# Patient Record
Sex: MALE | Race: BLACK OR AFRICAN AMERICAN | NOT HISPANIC OR LATINO
[De-identification: names, ages, dates, MRNs, and addresses within clinical notes are randomized per-mention and may not be internally consistent; named-entity substitution may affect disease eponyms.]

---

## 2022-03-23 ENCOUNTER — TELEPHONE (OUTPATIENT)
Dept: SCHEDULING | Age: 32
End: 2022-03-23

## 2022-03-23 ENCOUNTER — APPOINTMENT (OUTPATIENT)
Dept: URGENT CARE | Age: 32
End: 2022-03-23

## 2023-02-01 ENCOUNTER — LAB ENCOUNTER (OUTPATIENT)
Dept: LAB | Age: 33
End: 2023-02-01
Attending: INTERNAL MEDICINE
Payer: COMMERCIAL

## 2023-02-01 ENCOUNTER — OFFICE VISIT (OUTPATIENT)
Dept: INTERNAL MEDICINE CLINIC | Facility: CLINIC | Age: 33
End: 2023-02-01

## 2023-02-01 VITALS
HEART RATE: 80 BPM | SYSTOLIC BLOOD PRESSURE: 124 MMHG | WEIGHT: 224 LBS | HEIGHT: 74 IN | BODY MASS INDEX: 28.75 KG/M2 | DIASTOLIC BLOOD PRESSURE: 68 MMHG

## 2023-02-01 DIAGNOSIS — Z00.00 ANNUAL PHYSICAL EXAM: ICD-10-CM

## 2023-02-01 DIAGNOSIS — Z00.00 ANNUAL PHYSICAL EXAM: Primary | ICD-10-CM

## 2023-02-01 PROBLEM — J45.20 MILD INTERMITTENT ASTHMA (HCC): Status: ACTIVE | Noted: 2023-02-01

## 2023-02-01 PROBLEM — E78.00 HYPERCHOLESTEROLEMIA: Status: ACTIVE | Noted: 2023-02-01

## 2023-02-01 PROBLEM — J45.20 MILD INTERMITTENT ASTHMA: Status: ACTIVE | Noted: 2023-02-01

## 2023-02-01 LAB
ALBUMIN SERPL-MCNC: 4.1 G/DL (ref 3.4–5)
ALBUMIN/GLOB SERPL: 1.2 {RATIO} (ref 1–2)
ALP LIVER SERPL-CCNC: 48 U/L
ALT SERPL-CCNC: 28 U/L
ANION GAP SERPL CALC-SCNC: 6 MMOL/L (ref 0–18)
AST SERPL-CCNC: 14 U/L (ref 15–37)
BILIRUB SERPL-MCNC: 0.8 MG/DL (ref 0.1–2)
BUN BLD-MCNC: 14 MG/DL (ref 7–18)
BUN/CREAT SERPL: 11.4 (ref 10–20)
CALCIUM BLD-MCNC: 9.6 MG/DL (ref 8.5–10.1)
CHLORIDE SERPL-SCNC: 105 MMOL/L (ref 98–112)
CHOLEST SERPL-MCNC: 230 MG/DL (ref ?–200)
CO2 SERPL-SCNC: 30 MMOL/L (ref 21–32)
CREAT BLD-MCNC: 1.23 MG/DL
DEPRECATED RDW RBC AUTO: 42.1 FL (ref 35.1–46.3)
ERYTHROCYTE [DISTWIDTH] IN BLOOD BY AUTOMATED COUNT: 13.6 % (ref 11–15)
FASTING PATIENT LIPID ANSWER: NO
FASTING STATUS PATIENT QL REPORTED: NO
GFR SERPLBLD BASED ON 1.73 SQ M-ARVRAT: 79 ML/MIN/1.73M2 (ref 60–?)
GLOBULIN PLAS-MCNC: 3.3 G/DL (ref 2.8–4.4)
GLUCOSE BLD-MCNC: 109 MG/DL (ref 70–99)
HBV SURFACE AG SER-ACNC: <0.1 [IU]/L
HBV SURFACE AG SERPL QL IA: NONREACTIVE
HCT VFR BLD AUTO: 43.2 %
HCV AB SERPL QL IA: NONREACTIVE
HDLC SERPL-MCNC: 41 MG/DL (ref 40–59)
HGB BLD-MCNC: 14.1 G/DL
LDLC SERPL CALC-MCNC: 172 MG/DL (ref ?–100)
MCH RBC QN AUTO: 27.6 PG (ref 26–34)
MCHC RBC AUTO-ENTMCNC: 32.6 G/DL (ref 31–37)
MCV RBC AUTO: 84.7 FL
NONHDLC SERPL-MCNC: 189 MG/DL (ref ?–130)
OSMOLALITY SERPL CALC.SUM OF ELEC: 293 MOSM/KG (ref 275–295)
PLATELET # BLD AUTO: 219 10(3)UL (ref 150–450)
POTASSIUM SERPL-SCNC: 4.2 MMOL/L (ref 3.5–5.1)
PROT SERPL-MCNC: 7.4 G/DL (ref 6.4–8.2)
RBC # BLD AUTO: 5.1 X10(6)UL
SODIUM SERPL-SCNC: 141 MMOL/L (ref 136–145)
TRIGL SERPL-MCNC: 93 MG/DL (ref 30–149)
VLDLC SERPL CALC-MCNC: 19 MG/DL (ref 0–30)
WBC # BLD AUTO: 3.6 X10(3) UL (ref 4–11)

## 2023-02-01 PROCEDURE — 87389 HIV-1 AG W/HIV-1&-2 AB AG IA: CPT

## 2023-02-01 PROCEDURE — 85027 COMPLETE CBC AUTOMATED: CPT

## 2023-02-01 PROCEDURE — 36415 COLL VENOUS BLD VENIPUNCTURE: CPT

## 2023-02-01 PROCEDURE — 87591 N.GONORRHOEAE DNA AMP PROB: CPT

## 2023-02-01 PROCEDURE — 99385 PREV VISIT NEW AGE 18-39: CPT | Performed by: INTERNAL MEDICINE

## 2023-02-01 PROCEDURE — 3078F DIAST BP <80 MM HG: CPT | Performed by: INTERNAL MEDICINE

## 2023-02-01 PROCEDURE — 3008F BODY MASS INDEX DOCD: CPT | Performed by: INTERNAL MEDICINE

## 2023-02-01 PROCEDURE — 87340 HEPATITIS B SURFACE AG IA: CPT

## 2023-02-01 PROCEDURE — 80053 COMPREHEN METABOLIC PANEL: CPT

## 2023-02-01 PROCEDURE — 87491 CHLMYD TRACH DNA AMP PROBE: CPT

## 2023-02-01 PROCEDURE — 86803 HEPATITIS C AB TEST: CPT

## 2023-02-01 PROCEDURE — 80061 LIPID PANEL: CPT

## 2023-02-01 PROCEDURE — 3074F SYST BP LT 130 MM HG: CPT | Performed by: INTERNAL MEDICINE

## 2023-02-01 PROCEDURE — 86780 TREPONEMA PALLIDUM: CPT

## 2023-02-01 RX ORDER — ALBUTEROL SULFATE 90 UG/1
1-2 AEROSOL, METERED RESPIRATORY (INHALATION)
COMMUNITY
Start: 2020-05-22 | End: 2023-02-01

## 2023-02-01 RX ORDER — ALBUTEROL SULFATE 90 UG/1
1-2 AEROSOL, METERED RESPIRATORY (INHALATION)
Qty: 1 EACH | Refills: 3 | Status: SHIPPED | OUTPATIENT
Start: 2023-02-01 | End: 2025-12-02

## 2023-02-01 NOTE — PATIENT INSTRUCTIONS
Your physical today was normal.  Await results of blood and urine testing. Please get the updated COVID booster, and also a flu shot every fall. Continue albuterol as needed. Continue healthy diet and regular exercise.

## 2023-02-02 LAB
C TRACH DNA SPEC QL NAA+PROBE: NEGATIVE
N GONORRHOEA DNA SPEC QL NAA+PROBE: NEGATIVE

## 2023-02-03 LAB — T PALLIDUM AB SER QL: NEGATIVE

## 2023-02-13 ENCOUNTER — OFFICE VISIT (OUTPATIENT)
Dept: DERMATOLOGY CLINIC | Facility: CLINIC | Age: 33
End: 2023-02-13

## 2023-02-13 DIAGNOSIS — L73.9 FOLLICULITIS: ICD-10-CM

## 2023-02-13 DIAGNOSIS — L30.9 DERMATITIS: Primary | ICD-10-CM

## 2023-02-13 PROCEDURE — 99203 OFFICE O/P NEW LOW 30 MIN: CPT | Performed by: PHYSICIAN ASSISTANT

## 2023-02-13 RX ORDER — CLINDAMYCIN PHOSPHATE 10 UG/ML
1 LOTION TOPICAL DAILY
Qty: 60 ML | Refills: 0 | Status: SHIPPED | OUTPATIENT
Start: 2023-02-13

## 2023-02-13 RX ORDER — MOMETASONE FUROATE 1 MG/G
1 OINTMENT TOPICAL DAILY
Qty: 30 G | Refills: 3 | Status: SHIPPED | OUTPATIENT
Start: 2023-02-13

## 2023-04-19 NOTE — TELEPHONE ENCOUNTER
Refill Request for medication(s):     Last Office Visit: 2/13/23    Last Refill: 2/13/23    Pharmacy, Dosage verified: yes    Condition Update (if applicable):     Rx pended and sent to provider for approval, please advise. Thank You!

## 2023-04-20 RX ORDER — CLINDAMYCIN PHOSPHATE 10 UG/ML
LOTION TOPICAL
Qty: 60 ML | Refills: 0 | Status: SHIPPED | OUTPATIENT
Start: 2023-04-20

## 2023-04-26 ENCOUNTER — OFFICE VISIT (OUTPATIENT)
Dept: ORTHOPEDICS CLINIC | Facility: CLINIC | Age: 33
End: 2023-04-26

## 2023-04-26 ENCOUNTER — HOSPITAL ENCOUNTER (OUTPATIENT)
Dept: GENERAL RADIOLOGY | Facility: HOSPITAL | Age: 33
Discharge: HOME | End: 2023-04-26
Attending: ORTHOPAEDIC SURGERY
Payer: COMMERCIAL

## 2023-04-26 ENCOUNTER — TELEPHONE (OUTPATIENT)
Dept: ORTHOPEDICS CLINIC | Facility: CLINIC | Age: 33
End: 2023-04-26

## 2023-04-26 ENCOUNTER — OFFICE VISIT (OUTPATIENT)
Dept: INTERNAL MEDICINE CLINIC | Facility: CLINIC | Age: 33
End: 2023-04-26

## 2023-04-26 VITALS
OXYGEN SATURATION: 97 % | HEART RATE: 72 BPM | HEIGHT: 74 IN | DIASTOLIC BLOOD PRESSURE: 74 MMHG | BODY MASS INDEX: 29.57 KG/M2 | SYSTOLIC BLOOD PRESSURE: 123 MMHG | WEIGHT: 230.38 LBS

## 2023-04-26 DIAGNOSIS — R52 PAIN: ICD-10-CM

## 2023-04-26 DIAGNOSIS — M76.61 ACHILLES TENDINITIS OF BOTH LOWER EXTREMITIES: Primary | ICD-10-CM

## 2023-04-26 DIAGNOSIS — M76.62 ACHILLES TENDINITIS OF BOTH LOWER EXTREMITIES: Primary | ICD-10-CM

## 2023-04-26 DIAGNOSIS — M19.079 ANKLE ARTHRITIS: ICD-10-CM

## 2023-04-26 PROCEDURE — 3074F SYST BP LT 130 MM HG: CPT | Performed by: INTERNAL MEDICINE

## 2023-04-26 PROCEDURE — 99214 OFFICE O/P EST MOD 30 MIN: CPT | Performed by: INTERNAL MEDICINE

## 2023-04-26 PROCEDURE — 99204 OFFICE O/P NEW MOD 45 MIN: CPT | Performed by: ORTHOPAEDIC SURGERY

## 2023-04-26 PROCEDURE — 73610 X-RAY EXAM OF ANKLE: CPT | Performed by: ORTHOPAEDIC SURGERY

## 2023-04-26 PROCEDURE — 3078F DIAST BP <80 MM HG: CPT | Performed by: INTERNAL MEDICINE

## 2023-04-26 PROCEDURE — 3008F BODY MASS INDEX DOCD: CPT | Performed by: INTERNAL MEDICINE

## 2023-04-26 RX ORDER — IBUPROFEN 800 MG/1
800 TABLET ORAL EVERY 8 HOURS PRN
Qty: 60 TABLET | Refills: 1 | Status: SHIPPED | OUTPATIENT
Start: 2023-04-26

## 2023-04-26 NOTE — TELEPHONE ENCOUNTER
S/w pt and he states on 4/25/23 morning he had severe pain in his achilles. He is not sure if he injured it, but states he has heavy boots for work and does a lot of climbing, walking. Pt states he feels a popping and burning sensation. Pt was seen by pcp today. pcp rx'd PT. Offered opening today @ 330pm with Dr Soy Cummings and pt accepted. Address given.

## 2023-04-26 NOTE — TELEPHONE ENCOUNTER
New pt calling states he thinks he has a ripped achilles tendon states he needs appt today or tomorrow  please advise

## 2023-05-18 DIAGNOSIS — Z00.00 ANNUAL PHYSICAL EXAM: ICD-10-CM

## 2023-05-18 RX ORDER — ALBUTEROL SULFATE 90 UG/1
1-2 AEROSOL, METERED RESPIRATORY (INHALATION)
Qty: 8.5 EACH | Refills: 3 | Status: SHIPPED | OUTPATIENT
Start: 2023-05-18

## 2023-05-19 ENCOUNTER — TELEPHONE (OUTPATIENT)
Dept: ORTHOPEDICS CLINIC | Facility: CLINIC | Age: 33
End: 2023-05-19

## 2023-05-19 ENCOUNTER — OFFICE VISIT (OUTPATIENT)
Dept: ORTHOPEDICS CLINIC | Facility: CLINIC | Age: 33
End: 2023-05-19

## 2023-05-19 DIAGNOSIS — M76.61 ACHILLES TENDINITIS OF BOTH LOWER EXTREMITIES: Primary | ICD-10-CM

## 2023-05-19 DIAGNOSIS — M76.62 ACHILLES TENDINITIS OF BOTH LOWER EXTREMITIES: Primary | ICD-10-CM

## 2023-05-19 PROCEDURE — 99213 OFFICE O/P EST LOW 20 MIN: CPT | Performed by: ORTHOPAEDIC SURGERY

## 2023-05-19 NOTE — TELEPHONE ENCOUNTER
Spoke with patient reports he was released back to work for 5/21/23 with note provided today in office. No further action needed.

## 2023-05-19 NOTE — TELEPHONE ENCOUNTER
Patient states doctor needs to fill out a note for letter for work in order to be able to return on Sunday 05/21. States it is NOT FMLA and that  turned him away. Requesting a nurse to call him asap. States if this is not filled than he can get fired. Call got disconnected when he put me on a long hold.  Please advise

## 2023-09-14 ENCOUNTER — TELEPHONE (OUTPATIENT)
Dept: PHYSICAL THERAPY | Age: 33
End: 2023-09-14

## 2023-09-14 ENCOUNTER — OFFICE VISIT (OUTPATIENT)
Dept: PHYSICAL THERAPY | Age: 33
End: 2023-09-14
Attending: ORTHOPAEDIC SURGERY
Payer: COMMERCIAL

## 2023-09-14 ENCOUNTER — TELEPHONE (OUTPATIENT)
Dept: PHYSICAL THERAPY | Facility: HOSPITAL | Age: 33
End: 2023-09-14

## 2023-09-14 DIAGNOSIS — M76.61 ACHILLES TENDINITIS OF BOTH LOWER EXTREMITIES: Primary | ICD-10-CM

## 2023-09-14 DIAGNOSIS — M76.62 ACHILLES TENDINITIS OF BOTH LOWER EXTREMITIES: Primary | ICD-10-CM

## 2023-09-14 PROCEDURE — 97161 PT EVAL LOW COMPLEX 20 MIN: CPT

## 2023-09-14 PROCEDURE — 97110 THERAPEUTIC EXERCISES: CPT

## 2023-09-14 PROCEDURE — 97140 MANUAL THERAPY 1/> REGIONS: CPT

## 2023-09-22 ENCOUNTER — OFFICE VISIT (OUTPATIENT)
Dept: PHYSICAL THERAPY | Age: 33
End: 2023-09-22
Attending: ORTHOPAEDIC SURGERY
Payer: COMMERCIAL

## 2023-09-22 PROCEDURE — 97110 THERAPEUTIC EXERCISES: CPT

## 2023-09-22 PROCEDURE — 97140 MANUAL THERAPY 1/> REGIONS: CPT

## 2023-09-22 NOTE — PROGRESS NOTES
Dx:  Achilles tendinitis of both lower extremities (M76.61,M76.62          Insurance   PPO         Authorized  # visits by insurance  :  12   Expiration date  of Authorization:10/28/23 : insurance  Initial POC# of visits: 12       : POC: 12/26/23  Eval date/latest PN:9/14/23    Authorizing Physician: Dr. Myles Catalan  Next MD visit: TBD  Fall Risk: standard         Precautions: none         Subjective: Pt states that he was fine after last session  PAIN LEVEL:0/10  Assessment:     PT continued with MFR as noted below and started pt on some loaded heel raises, started with shuttle  then in standing and will start doing eccentric heel raises on BLE, added black resistance for ankle strengthening for HEP  Discussed and did hip ex for abd/extensors x5#      Objective: none this day        Goals:   goals addressed this day as noted above  Goals: to be met in 12 visits then will reassess      Pt will be I with HEP,its progression , demonstrating proper performance of exercises, >75% of the time to maintain progress in therapy   Pt will demonstrate improved strength on B ankle PF to half a grade or better for improved gait /stairs skills  Pt will demonstrate improved and pain free sagittal AROM   ankle for improved heel to toe gait pattern. Pt will report decreased in pain ,symptoms and functional limitations by 50% or better to be able to return to PLOF. Pt will demonstrate improved SLS on 30 secs ,painfree with perturbations to improve gait on various surfaces to return to PLOF safely. Pt will demonstrate ability to ambulate with symmetrical gait, equal stance >75% of the time . Pt will report <5/10 pain with work, recreational  and home activities such as walking   Plan: cont per POC     Date: 9/22/2023  TX#: 2/12 Date:               TX#: 3/ Date:              TX#: 4/ Date:               TX#: 5/   Date:    Tx#: 6/   Theraex: Inclined stretches 30x3 gastroc/soleus  Shuttle BLE heel raises 6 bands 2x10 then standing heel raises 2x10  SL hip abd 5# x10  Prone hip extension 5# x10 BLE: knee flexed and extended         Manual: MFR on gastroc/soleus area with ROM movements  MFR anterior ankle area with DF movements  MFR plantar aspect  Prone MFR with knee flexed working on IN/EV on foot  STJ mobs        Gait/NMRed:        Modalities         HEP GIVEN: written copy given unless otherwise indicated   9/22/23: heel raises , hip ex,:declined need for pictures    Charges: thereaex x1 ( 15mins), man mob x2 (25)       Total Timed Treatment: 40 min  Total Treatment Time: 40 min

## 2023-09-26 NOTE — PROGRESS NOTES
Dx:  Achilles tendinitis of both lower extremities (M76.61,M76.62          Insurance   PPO         Authorized  # visits by insurance  :  12   Expiration date  of Authorization:10/28/23 : insurance  Initial POC# of visits: 12       : POC: 12/26/23  Eval date/latest PN:9/14/23    Authorizing Physician: Dr. Ramón Gaines MD visit: TBD  Fall Risk: standard         Precautions: none         Subjective: Pt states that he was fine after last session  PAIN LEVEL:0/10  Assessment:     PT continued with MFR as noted below and started pt on some loaded heel raises, started with shuttle  then in standing and will start doing eccentric heel raises on BLE, added black resistance for ankle strengthening for HEP  Discussed and did hip ex for abd/extensors x5#      Objective: none this day        Goals:   goals addressed this day as noted above  Goals: to be met in 12 visits then will reassess      Pt will be I with HEP,its progression , demonstrating proper performance of exercises, >75% of the time to maintain progress in therapy   Pt will demonstrate improved strength on B ankle PF to half a grade or better for improved gait /stairs skills  Pt will demonstrate improved and pain free sagittal AROM   ankle for improved heel to toe gait pattern. Pt will report decreased in pain ,symptoms and functional limitations by 50% or better to be able to return to PLOF. Pt will demonstrate improved SLS on 30 secs ,painfree with perturbations to improve gait on various surfaces to return to PLOF safely. Pt will demonstrate ability to ambulate with symmetrical gait, equal stance >75% of the time . Pt will report <5/10 pain with work, recreational  and home activities such as walking   Plan: cont per POC     Date: 9/22/2023  TX#: 2/12 Date:               TX#: 3/ Date:              TX#: 4/ Date:               TX#: 5/   Date:    Tx#: 6/   Theraex: Inclined stretches 30x3 gastroc/soleus  Shuttle BLE heel raises 6 bands 2x10 then standing heel raises 2x10  SL hip abd 5# x10  Prone hip extension 5# x10 BLE: knee flexed and extended         Manual: MFR on gastroc/soleus area with ROM movements  MFR anterior ankle area with DF movements  MFR plantar aspect  Prone MFR with knee flexed working on IN/EV on foot  STJ mobs        Gait/NMRed:        Modalities         HEP GIVEN: written copy given unless otherwise indicated   9/22/23: heel raises , hip ex,:declined need for pictures    Charges: thereaex x1 ( 15mins), man mob x2 (25)       Total Timed Treatment: 40 min  Total Treatment Time: 40 min

## 2023-09-27 ENCOUNTER — TELEPHONE (OUTPATIENT)
Dept: PHYSICAL THERAPY | Age: 33
End: 2023-09-27

## 2023-09-27 ENCOUNTER — APPOINTMENT (OUTPATIENT)
Dept: PHYSICAL THERAPY | Age: 33
End: 2023-09-27
Attending: ORTHOPAEDIC SURGERY
Payer: COMMERCIAL

## 2023-09-28 ENCOUNTER — OFFICE VISIT (OUTPATIENT)
Dept: PHYSICAL THERAPY | Age: 33
End: 2023-09-28
Attending: ORTHOPAEDIC SURGERY
Payer: COMMERCIAL

## 2023-09-28 PROCEDURE — 97110 THERAPEUTIC EXERCISES: CPT

## 2023-09-28 PROCEDURE — 97112 NEUROMUSCULAR REEDUCATION: CPT

## 2023-09-28 PROCEDURE — 97140 MANUAL THERAPY 1/> REGIONS: CPT

## 2023-09-28 NOTE — PROGRESS NOTES
Dx:  Achilles tendinitis of both lower extremities (M76.61,M76.62          Insurance   PPO         Authorized  # visits by insurance  :  12   Expiration date  of Authorization:10/28/23 : insurance  Initial POC# of visits: 12       : POC: 12/26/23  Eval date/latest PN:9/14/23    Authorizing Physician: Dr. Michael Gaines MD visit: TBD  Fall Risk: standard         Precautions: none         Subjective: pt states that he feels better, walking and running could cause pain  PAIN LEVEL:0/10  Assessment:     PT continued with MFR as noted below and  continued with  eccentric / loaded heel raises, started with shuttle single leg  then in standing with 10lbs  heel raises on BLE,         Objective: none this day        Goals:   goals addressed this day as noted above  Goals: to be met in 12 visits then will reassess      Pt will be I with HEP,its progression , demonstrating proper performance of exercises, >75% of the time to maintain progress in therapy   Pt will demonstrate improved strength on B ankle PF to half a grade or better for improved gait /stairs skills  Pt will demonstrate improved and pain free sagittal AROM   ankle for improved heel to toe gait pattern. Pt will report decreased in pain ,symptoms and functional limitations by 50% or better to be able to return to PLOF. Pt will demonstrate improved SLS on 30 secs ,painfree with perturbations to improve gait on various surfaces to return to PLOF safely. Pt will demonstrate ability to ambulate with symmetrical gait, equal stance >75% of the time . Pt will report <5/10 pain with work, recreational  and home activities such as walking   Plan: cont per POC     Date: 9/22/2023  TX#: 2/12 Date:         9/28/23      TX#: 3/12 Date:              TX#: 4/ Date:               TX#: 5/   Date:    Tx#: 6/   Theraex: Inclined stretches 30x3 gastroc/soleus  Shuttle BLE heel raises 6 bands 2x10 then standing heel raises 2x10  SL hip abd 5# x10  Prone hip extension 5# x10 BLE: knee flexed and extended   Inclined stretches 30x3 gastroc/soleus  Shuttle BLE heel raises 6 bands 2x10 then single  raises 2x10 4 bands: eccentric   Standing x 10# 2x10      Manual: MFR on gastroc/soleus area with ROM movements  MFR anterior ankle area with DF movements  MFR plantar aspect  Prone MFR with knee flexed working on IN/EV on foot  STJ mobs  MFR on gastroc/soleus area with ROM movements  MFR anterior ankle area with DF movements  MFR plantar aspect  Prone MFR with knee flexed working on IN/EV on foot  STJ mobs      Gait/NMRed:  Short arch SLS with a\ball throws 2x10 then standing AROM for ankle range      Modalities         HEP GIVEN: written copy given unless otherwise indicated   9/22/23: heel raises , hip ex,:declined need for pictures    Charges: thereaex x1 ( 12mins), man mob x2 (23) ,liz turner x1 (8 mins)      Total Timed Treatment: 43 min  Total Treatment Time: 43min

## 2023-10-04 ENCOUNTER — OFFICE VISIT (OUTPATIENT)
Dept: PHYSICAL THERAPY | Age: 33
End: 2023-10-04
Attending: ORTHOPAEDIC SURGERY
Payer: COMMERCIAL

## 2023-10-04 PROCEDURE — 97140 MANUAL THERAPY 1/> REGIONS: CPT

## 2023-10-04 PROCEDURE — 97112 NEUROMUSCULAR REEDUCATION: CPT

## 2023-10-04 PROCEDURE — 97110 THERAPEUTIC EXERCISES: CPT

## 2023-10-04 NOTE — PROGRESS NOTES
Dx:  Achilles tendinitis of both lower extremities (M76.61,M76.62          Insurance   PPO         Authorized  # visits by insurance  :  12   Expiration date  of Authorization:10/28/23 : insurance  Initial POC# of visits: 12       : POC: 12/26/23  Eval date/latest PN:9/14/23    Authorizing Physician: Dr. Michael Gaines MD visit: TBD  Fall Risk: standard         Precautions: none         Subjective: pt states that he still have issues with running and prolonged walking. He relates that he feels like everytime he goes to PT it helps a bit but come back. Pt state that that his worst pain is at 7/10, cease after  the activities. PAIN LEVEL:0/10  Assessment:   PT continued with MFR as noted below and  continued with  eccentric / loaded heel raises,  on 2 inch stool  started today and also advanced hip dynamic exercises      Objective: none this day        Goals:   goals addressed this day as noted above  Goals: to be met in 12 visits then will reassess      Pt will be I with HEP,its progression , demonstrating proper performance of exercises, >75% of the time to maintain progress in therapy   Pt will demonstrate improved strength on B ankle PF to half a grade or better for improved gait /stairs skills  Pt will demonstrate improved and pain free sagittal AROM   ankle for improved heel to toe gait pattern. Pt will report decreased in pain ,symptoms and functional limitations by 50% or better to be able to return to PLOF. Pt will demonstrate improved SLS on 30 secs ,painfree with perturbations to improve gait on various surfaces to return to PLOF safely. Pt will demonstrate ability to ambulate with symmetrical gait, equal stance >75% of the time . Pt will report <5/10 pain with work, recreational  and home activities such as walking   Plan: cont per POC     Date: 9/22/2023  TX#: 2/12 Date:         9/28/23      TX#: 3/12 Date:      10/4/243        TX#: 4/12 Date:               TX#: 5/   Date:    Tx#: 6/   Theraex: Inclined stretches 30x3 gastroc/soleus  Shuttle BLE heel raises 6 bands 2x10 then standing heel raises 2x10  SL hip abd 5# x10  Prone hip extension 5# x10 BLE: knee flexed and extended   Inclined stretches 30x3 gastroc/soleus  Shuttle BLE heel raises 6 bands 2x10 then single  raises 2x10 4 bands: eccentric   Standing x 10# 2x10 Inclined stretches 30x3 gastroc/soleus  Standing heel raises eccentric one leg each 2 inches stool 2x10    Sidesteps with blue band x3 rounds  Monster and retro walks BTB x 3 rounds     Manual: MFR on gastroc/soleus area with ROM movements  MFR anterior ankle area with DF movements  MFR plantar aspect  Prone MFR with knee flexed working on IN/EV on foot  STJ mobs  MFR on gastroc/soleus area with ROM movements  MFR anterior ankle area with DF movements  MFR plantar aspect  Prone MFR with knee flexed working on IN/EV on foot  STJ mobs MFR on gastroc/soleus area with ROM movements  MFR anterior ankle area with DF movements  MFR plantar aspect  Prone MFR with knee flexed working on IN/EV on foot  STJ mobs  IASTM on the heeel area bilateral     Gait/NMRed:  Short arch SLS with a\ball throws 2x10 then standing AROM for ankle range Short arch SLS with a\ball throws 2x10   Single leg dead lift arch maintenance x10 each side       Modalities         HEP GIVEN: written copy given unless otherwise indicated   9/22/23: heel raises , hip ex,:declined need for pictures    Charges: thereaex x1 ( 12mins), man mob x2 (23) ,katianao yue x1 (10 mins)      Total Timed Treatment: 45 min  Total Treatment Time: 45min

## 2023-10-05 ENCOUNTER — OFFICE VISIT (OUTPATIENT)
Dept: FAMILY MEDICINE CLINIC | Facility: CLINIC | Age: 33
End: 2023-10-05

## 2023-10-05 VITALS
BODY MASS INDEX: 29.9 KG/M2 | WEIGHT: 233 LBS | HEIGHT: 74 IN | SYSTOLIC BLOOD PRESSURE: 144 MMHG | TEMPERATURE: 97 F | DIASTOLIC BLOOD PRESSURE: 80 MMHG | HEART RATE: 80 BPM

## 2023-10-05 DIAGNOSIS — M76.62 ACHILLES TENDINITIS OF BOTH LOWER EXTREMITIES: Primary | ICD-10-CM

## 2023-10-05 DIAGNOSIS — R73.9 HYPERGLYCEMIA: ICD-10-CM

## 2023-10-05 DIAGNOSIS — M21.41 PES PLANUS OF BOTH FEET: ICD-10-CM

## 2023-10-05 DIAGNOSIS — E78.2 MIXED HYPERLIPIDEMIA: ICD-10-CM

## 2023-10-05 DIAGNOSIS — Z00.00 ANNUAL PHYSICAL EXAM: ICD-10-CM

## 2023-10-05 DIAGNOSIS — J34.3 HYPERTROPHY, NASAL, TURBINATE: ICD-10-CM

## 2023-10-05 DIAGNOSIS — M21.42 PES PLANUS OF BOTH FEET: ICD-10-CM

## 2023-10-05 DIAGNOSIS — J30.89 OTHER ALLERGIC RHINITIS: ICD-10-CM

## 2023-10-05 DIAGNOSIS — M76.61 ACHILLES TENDINITIS OF BOTH LOWER EXTREMITIES: Primary | ICD-10-CM

## 2023-10-05 DIAGNOSIS — D72.819 LEUKOPENIA, UNSPECIFIED TYPE: ICD-10-CM

## 2023-10-05 PROBLEM — M21.40 FLAT FOOT: Status: ACTIVE | Noted: 2023-10-05

## 2023-10-05 RX ORDER — MONTELUKAST SODIUM 10 MG/1
10 TABLET ORAL NIGHTLY
Qty: 90 TABLET | Refills: 1 | Status: SHIPPED | OUTPATIENT
Start: 2023-10-05 | End: 2024-04-02

## 2023-10-05 RX ORDER — FLUTICASONE PROPIONATE 50 MCG
2 SPRAY, SUSPENSION (ML) NASAL DAILY
Qty: 3 EACH | Refills: 1 | Status: SHIPPED | OUTPATIENT
Start: 2023-10-05 | End: 2024-02-02

## 2023-10-05 RX ORDER — ALBUTEROL SULFATE 90 UG/1
1-2 AEROSOL, METERED RESPIRATORY (INHALATION)
Qty: 8.5 EACH | Refills: 1 | Status: SHIPPED | OUTPATIENT
Start: 2023-10-05

## 2023-10-05 RX ORDER — FLUTICASONE PROPIONATE 50 MCG
2 SPRAY, SUSPENSION (ML) NASAL DAILY
Qty: 3 EACH | Refills: 1 | Status: SHIPPED | OUTPATIENT
Start: 2023-10-05 | End: 2023-10-05

## 2023-10-12 ENCOUNTER — APPOINTMENT (OUTPATIENT)
Dept: PHYSICAL THERAPY | Age: 33
End: 2023-10-12
Attending: ORTHOPAEDIC SURGERY
Payer: COMMERCIAL

## 2023-10-12 ENCOUNTER — OFFICE VISIT (OUTPATIENT)
Dept: PODIATRY CLINIC | Facility: CLINIC | Age: 33
End: 2023-10-12
Payer: COMMERCIAL

## 2023-10-12 ENCOUNTER — OFFICE VISIT (OUTPATIENT)
Dept: ORTHOPEDICS CLINIC | Facility: CLINIC | Age: 33
End: 2023-10-12
Payer: COMMERCIAL

## 2023-10-12 VITALS — HEART RATE: 62 BPM | SYSTOLIC BLOOD PRESSURE: 143 MMHG | DIASTOLIC BLOOD PRESSURE: 95 MMHG

## 2023-10-12 DIAGNOSIS — M72.2 PLANTAR FASCIITIS: ICD-10-CM

## 2023-10-12 DIAGNOSIS — M76.62 ACHILLES TENDINITIS OF BOTH LOWER EXTREMITIES: ICD-10-CM

## 2023-10-12 DIAGNOSIS — M76.62 ACHILLES TENDINITIS OF BOTH LOWER EXTREMITIES: Primary | ICD-10-CM

## 2023-10-12 DIAGNOSIS — M76.61 ACHILLES TENDINITIS OF BOTH LOWER EXTREMITIES: Primary | ICD-10-CM

## 2023-10-12 DIAGNOSIS — M76.61 ACHILLES TENDINITIS OF BOTH LOWER EXTREMITIES: ICD-10-CM

## 2023-10-12 DIAGNOSIS — M21.6X9 ACQUIRED ADDUCTION DEFORMITY OF FOREFOOT: Primary | ICD-10-CM

## 2023-10-12 PROCEDURE — L3485 SHOE HEEL PAD REMOVABLE FOR: HCPCS | Performed by: STUDENT IN AN ORGANIZED HEALTH CARE EDUCATION/TRAINING PROGRAM

## 2023-10-12 PROCEDURE — 99204 OFFICE O/P NEW MOD 45 MIN: CPT | Performed by: STUDENT IN AN ORGANIZED HEALTH CARE EDUCATION/TRAINING PROGRAM

## 2023-10-12 PROCEDURE — 20550 NJX 1 TENDON SHEATH/LIGAMENT: CPT | Performed by: STUDENT IN AN ORGANIZED HEALTH CARE EDUCATION/TRAINING PROGRAM

## 2023-10-12 PROCEDURE — 3080F DIAST BP >= 90 MM HG: CPT | Performed by: STUDENT IN AN ORGANIZED HEALTH CARE EDUCATION/TRAINING PROGRAM

## 2023-10-12 PROCEDURE — 3077F SYST BP >= 140 MM HG: CPT | Performed by: STUDENT IN AN ORGANIZED HEALTH CARE EDUCATION/TRAINING PROGRAM

## 2023-10-12 PROCEDURE — 99212 OFFICE O/P EST SF 10 MIN: CPT | Performed by: ORTHOPAEDIC SURGERY

## 2023-10-12 RX ORDER — TRIAMCINOLONE ACETONIDE 40 MG/ML
40 INJECTION, SUSPENSION INTRA-ARTICULAR; INTRAMUSCULAR ONCE
Status: COMPLETED | OUTPATIENT
Start: 2023-10-12 | End: 2023-10-12

## 2023-10-12 RX ORDER — DEXAMETHASONE SODIUM PHOSPHATE 4 MG/ML
4 VIAL (ML) INJECTION ONCE
Status: COMPLETED | OUTPATIENT
Start: 2023-10-12 | End: 2023-10-12

## 2023-10-12 RX ADMIN — TRIAMCINOLONE ACETONIDE 40 MG: 40 INJECTION, SUSPENSION INTRA-ARTICULAR; INTRAMUSCULAR at 13:46:00

## 2023-10-12 RX ADMIN — DEXAMETHASONE SODIUM PHOSPHATE 4 MG: 4 MG/ML VIAL (ML) INJECTION at 13:45:00

## 2023-10-12 RX ADMIN — TRIAMCINOLONE ACETONIDE 40 MG: 40 INJECTION, SUSPENSION INTRA-ARTICULAR; INTRAMUSCULAR at 13:47:00

## 2023-10-12 NOTE — PROGRESS NOTES
[facility], [facility]  Progress Note      Gomez Banuelos is a 28year old male. Patient presents with:  Heel Pain: Bilateral - onset few months ago - no injury - has pain in the bottom of heel rated as 10/10 with weight bearing and walking -             HPI:     Patient is a pleasant 28year old male who presents to [facility] of [facility] heel pain. Denies any injury or trauma. States that the pain has been ongoing for few months now. She admits to severe pain on the bottom of his heels and behind his heels. Today he rates his pain a 10/10. Pt admits that he is currently undergoing physical therapy    Allergies: Patient has no known allergies. Current Outpatient Medications   Medication Sig Dispense Refill    methylPREDNISolone 4 MG Oral Tablet Therapy Pack Take per package insert (instructions). Take as directed on the box 21 tablet 0    benzonatate 100 MG Oral Cap Take 1 capsule (100 mg total) by mouth 3 (three) times daily as needed. 10 capsule 0    predniSONE 20 MG Oral Tab Take 2 tablets (40 mg total) by mouth daily for 5 days. 10 tablet 0    fluticasone propionate 50 MCG/ACT Nasal Suspension 2 sprays by Nasal route daily. Squeeze nose after sprays. Do not snort into the back of the throat. 3 each 1    montelukast (SINGULAIR) 10 MG Oral Tab Take 1 tablet (10 mg total) by mouth nightly. 90 tablet 1    albuterol 108 (90 Base) MCG/ACT Inhalation Aero Soln Inhale 1-2 puffs into the lungs every 4 to 6 hours as needed. 8.5 each 1    ibuprofen 800 MG Oral Tab Take 1 tablet (800 mg total) by mouth every 8 (eight) hours as needed for Pain. 60 tablet 1    diclofenac 1 % External Gel Apply 2 g topically 4 (four) times daily.  50 g 1      Past Medical History:   Diagnosis Date    Hypercholesterolemia     Mild intermittent asthma       Past Surgical History:   Procedure Laterality Date    INGUINAL HERNIA REPAIR Right 2015      Family History   Problem Relation Age of Onset    Prostate Cancer Father     Other (Lung Cancer) Maternal Grandmother     Pacemaker Maternal Grandfather     Diabetes Maternal Uncle       Social History    Socioeconomic History      Marital status: Single    Tobacco Use      Smoking status: Never      Smokeless tobacco: Never    Substance and Sexual Activity      Alcohol use: Never      Drug use: Never    Other Topics      Concerns:        History of tanning: No        Reaction to local anesthetic: No        Pt has a pacemaker: No        Pt has a defibrillator: No          REVIEW OF SYSTEMS:     Denies nause, fever, chills  No calf pain  Denies chest pain or SOB      EXAM:   BP (!) 143/95   Pulse 62   GENERAL: well developed, well nourished, in no apparent distress  EXTREMITIES:   1. Integument: Normal skin temperature and turgor  2. Vascular: Dorsalis pedis two out of four bilateral and posterior tibial pulses two out of   four bilateral, capillary refill normal.   3. Musculoskeletal: All muscle groups are graded 5 out of 5 in the foot and ankle. Decrease Ankle joint range of motion with less than 90 degrees of dorsiflexion. Pain with palpation to posterior Achilles tendon insertion and medial calcaneal tuberosity    4. Neurological: Normal sharp dull sensation; reflexes normal.      XR CHEST PA + LAT CHEST (CPT=71046)    Result Date: 10/16/2023  CONCLUSION: No radiographic evidence of acute cardiopulmonary abnormality.     Dictated by (CST): Sanju Mcmanus MD on 10/16/2023 at 10:40 AM     Finalized by (CST): Sanju Mcmanus MD on 10/16/2023 at 10:40 AM            ASSESSMENT AND PLAN:   Diagnoses and all orders for this visit:    Acquired adduction deformity of forefoot    Achilles tendinitis of both lower extremities    Plantar fasciitis  -     dexamethasone (Decadron) 4 MG/ML injection 4 mg  -     dexamethasone (Decadron) 4 MG/ML injection 4 mg  -     triamcinolone acetonide (Kenalog-40) 40 MG/ML injection 40 mg  -     triamcinolone acetonide (Kenalog-40) 40 MG/ML injection 40 mg    Other orders  - methylPREDNISolone 4 MG Oral Tablet Therapy Pack; Take per package insert (instructions). Take as directed on the box        Plan:     -Patient examined, chart history reviewed. -Discussed etiology of patient's condition and various treatment options.  -Discussed importance of proper shoe gear and orthotics. Patient to avoid walking barefoot at all times.  -Discussed importance of stretching exercises. Patient to aim to stretch 3-5 times daily.  -Discussed steroid injection with patient including benefits and risks. Patient agrees to injection and written consent was obtained. -After prepping site with alcohol, administered steroid injection to bilateral medial plantar heels consisting of 1 cc of 1% lidocaine plain, 1 cc of dexamethasone, and  1 cc of Kenalog 40 to each. Patient tolerated the injection well and there were no complications. Site was dressed with Band-Aid.  -Wear supportive shoe gear and inserts at all times with ambulation. Avoid walking barefoot. - Perform stretching exercises 3-5 times daily. I  -Continue physical therapy treatment  -Discussed possible need for surgical intervention of posterior heel spurs. Will re evaluate patient in few weeks   -Dipsesned heel lifts  - Monitor response to steroid injection.  - Follow-up in 2 months for reevaluation. The patient indicates understanding of these issues and agrees to the plan.         Berny Archer DPM

## 2023-10-12 NOTE — PROGRESS NOTES
NURSING INTAKE COMMENTS: Patient presents with: Ankle Pain: Bilateral achilles tendinitis f/u- also stated heel pain- rates pain 10/10 most of the time- has numbness and tingling sometimes- has scheduled office visit w/ Podiatrist today      HPI: This 28year old male presents today with complaints of bilateral heel pain follow-up. He reports ongoing pain in the posterior heels. He has a podiatry appointment later today. He has been doing some physical therapy with minimal improvement. He wears steel toe boots at work. He does occasional running on the weekends. Past Medical History:   Diagnosis Date    Hypercholesterolemia     Mild intermittent asthma      Past Surgical History:   Procedure Laterality Date    INGUINAL HERNIA REPAIR Right 2015     Current Outpatient Medications   Medication Sig Dispense Refill    fluticasone propionate 50 MCG/ACT Nasal Suspension 2 sprays by Nasal route daily. Squeeze nose after sprays. Do not snort into the back of the throat. 3 each 1    montelukast (SINGULAIR) 10 MG Oral Tab Take 1 tablet (10 mg total) by mouth nightly. 90 tablet 1    albuterol 108 (90 Base) MCG/ACT Inhalation Aero Soln Inhale 1-2 puffs into the lungs every 4 to 6 hours as needed. 8.5 each 1    ibuprofen 800 MG Oral Tab Take 1 tablet (800 mg total) by mouth every 8 (eight) hours as needed for Pain. 60 tablet 1    diclofenac 1 % External Gel Apply 2 g topically 4 (four) times daily.  50 g 1     No Known Allergies  Family History   Problem Relation Age of Onset    Prostate Cancer Father     Other (Lung Cancer) Maternal Grandmother     Pacemaker Maternal Grandfather     Diabetes Maternal Uncle        Social History    Occupational History      Not on file    Tobacco Use      Smoking status: Never      Smokeless tobacco: Never    Substance and Sexual Activity      Alcohol use: Never      Drug use: Never      Sexual activity: Not on file       Review of Systems:  GENERAL: denies fevers, chills, night sweats, fatigue, unintentional weight loss/gain  SKIN: denies skin lesions, open sores, rash  HEENT:denies recent vision change, new nasal congestion,hearing loss, tinnitus, sore throat, headaches  RESPIRATORY: denies new shortness of breath, cough, asthma, wheezing  CARDIOVASCULAR: denies chest pain, leg cramps with exertion, palpitations, leg swelling  GI: denies abdominal pain, nausea, vomiting, diarrhea, constipation, hematochezia, worsening heartburn or stomach ulcers  : denies dysuria, hematuria, incontinence, increased frequency, urgency, difficulty urinating  MUSCULOSKELETAL: denies musculoskeletal complaints other than in HPI  NEURO: denies numbness, tingling, weakness, balance issues, dizziness, memory loss  PSYCHIATRIC: denies Hx of depression, anxiety, other psychiatric disorders  HEMATOLOGIC: denies blood clots, anemia, blood clotting disorders, blood transfusion  ENDOCRINE: denies autoimmune disease, thyroid issues, or diabetes  ALLERGY: denies asthma, seasonal allergies    Physical Examination:    There were no vitals taken for this visit. Constitutional: appears well hydrated, alert and responsive, no acute distress noted  Extremities: Bilateral heels tender at the Achilles insertion. Mild tenderness in the retrocalcaneal bursal area. Squeeze testing of the calcaneus negative bilaterally. Plantar heel nontender. Mild pain with resisted plantarflexion. Neurological: Unchanged    Imaging:   No results found. Labs:  Lab Results   Component Value Date    WBC 3.6 (L) 02/01/2023    HGB 14.1 02/01/2023    .0 02/01/2023      Lab Results   Component Value Date     (H) 02/01/2023    BUN 14 02/01/2023    CREATSERUM 1.23 02/01/2023        Assessment and Plan:  Diagnoses and all orders for this visit:    Achilles tendinitis of both lower extremities        Assessment: Bilateral Achilles tendinitis, Shelby's deformity    Plan: I recommend follow-up with podiatry.   Follow-up with me again as needed. Advised continued heel cord stretching, icing, oral anti-inflammatory use for now. Follow Up: Return if symptoms worsen or fail to improve.     Lili Shafer MD

## 2023-10-12 NOTE — PROGRESS NOTES
Per verbal order from Dr Reyna Moreira, draw up 1 ml Dexamethasone Sodium, 1 ml Lidocaine 1% and 1 ml Kenalog 40 mg for this patient bilateral heels.  Elyse Chaney

## 2023-10-13 ENCOUNTER — TELEPHONE (OUTPATIENT)
Dept: PODIATRY CLINIC | Facility: CLINIC | Age: 33
End: 2023-10-13

## 2023-10-13 NOTE — TELEPHONE ENCOUNTER
Per pt he was supposed to get pain medication and something to put on his foot sent to [facility] on [address].  Please advise

## 2023-10-13 NOTE — TELEPHONE ENCOUNTER
Pt was seen by Dr Hailee Seymour on 10/12/23. Office note not in. No rx's have been sent. Please advise? Or should I wait until next wk when Dr is taking call?

## 2023-10-15 RX ORDER — METHYLPREDNISOLONE 4 MG/1
TABLET ORAL
Qty: 21 TABLET | Refills: 0 | Status: SHIPPED | OUTPATIENT
Start: 2023-10-15

## 2023-10-16 ENCOUNTER — HOSPITAL ENCOUNTER (OUTPATIENT)
Age: 33
Discharge: HOME | End: 2023-10-16
Payer: COMMERCIAL

## 2023-10-16 ENCOUNTER — APPOINTMENT (OUTPATIENT)
Dept: GENERAL RADIOLOGY | Age: 33
End: 2023-10-16
Attending: NURSE PRACTITIONER
Payer: COMMERCIAL

## 2023-10-16 VITALS
WEIGHT: 230 LBS | OXYGEN SATURATION: 99 % | TEMPERATURE: 98 F | BODY MASS INDEX: 29.52 KG/M2 | HEIGHT: 74 IN | HEART RATE: 68 BPM | SYSTOLIC BLOOD PRESSURE: 157 MMHG | DIASTOLIC BLOOD PRESSURE: 108 MMHG | RESPIRATION RATE: 20 BRPM

## 2023-10-16 DIAGNOSIS — R05.3 CHRONIC COUGH: Primary | ICD-10-CM

## 2023-10-16 DIAGNOSIS — R03.0 ELEVATED BLOOD PRESSURE READING: ICD-10-CM

## 2023-10-16 PROCEDURE — 71046 X-RAY EXAM CHEST 2 VIEWS: CPT | Performed by: NURSE PRACTITIONER

## 2023-10-16 PROCEDURE — 99213 OFFICE O/P EST LOW 20 MIN: CPT | Performed by: NURSE PRACTITIONER

## 2023-10-16 RX ORDER — BENZONATATE 100 MG/1
100 CAPSULE ORAL 3 TIMES DAILY PRN
Qty: 10 CAPSULE | Refills: 0 | Status: SHIPPED | OUTPATIENT
Start: 2023-10-16

## 2023-10-16 RX ORDER — PREDNISONE 20 MG/1
40 TABLET ORAL DAILY
Qty: 10 TABLET | Refills: 0 | Status: SHIPPED | OUTPATIENT
Start: 2023-10-16 | End: 2023-10-21

## 2023-10-23 NOTE — PROGRESS NOTES
Dx:  Achilles tendinitis of both lower extremities (M76.61,M76.62          Insurance   PPO         Authorized  # visits by insurance  :  12   Expiration date  of Authorization:10/28/23 : insurance  Initial POC# of visits: 12       : POC: 12/26/23  Eval date/latest PN:9/14/23    Authorizing Physician: Dr. Saurabh Joseph  Next MD visit: TBD  Fall Risk: standard         Precautions: none         Subjective: pt states that he still have issues with running and prolonged walking. He relates that he feels like everytime he goes to PT it helps a bit but come back. Pt state that that his worst pain is at 7/10, cease after  the activities. PAIN LEVEL:0/10  Assessment:   PT continued with MFR as noted below and  continued with  eccentric / loaded heel raises,  on 2 inch stool  started today and also advanced hip dynamic exercises      Objective: none this day        Goals:   goals addressed this day as noted above  Goals: to be met in 12 visits then will reassess      Pt will be I with HEP,its progression , demonstrating proper performance of exercises, >75% of the time to maintain progress in therapy   Pt will demonstrate improved strength on B ankle PF to half a grade or better for improved gait /stairs skills  Pt will demonstrate improved and pain free sagittal AROM   ankle for improved heel to toe gait pattern. Pt will report decreased in pain ,symptoms and functional limitations by 50% or better to be able to return to PLOF. Pt will demonstrate improved SLS on 30 secs ,painfree with perturbations to improve gait on various surfaces to return to PLOF safely. Pt will demonstrate ability to ambulate with symmetrical gait, equal stance >75% of the time . Pt will report <5/10 pain with work, recreational  and home activities such as walking   Plan: cont per POC     Date: 9/22/2023  TX#: 2/12 Date:         9/28/23      TX#: 3/12 Date:      10/4/243        TX#: 4/12 Date:               TX#: 5/   Date:    Tx#: 6/   Theraex: Inclined stretches 30x3 gastroc/soleus  Shuttle BLE heel raises 6 bands 2x10 then standing heel raises 2x10  SL hip abd 5# x10  Prone hip extension 5# x10 BLE: knee flexed and extended   Inclined stretches 30x3 gastroc/soleus  Shuttle BLE heel raises 6 bands 2x10 then single  raises 2x10 4 bands: eccentric   Standing x 10# 2x10 Inclined stretches 30x3 gastroc/soleus  Standing heel raises eccentric one leg each 2 inches stool 2x10    Sidesteps with blue band x3 rounds  Monster and retro walks BTB x 3 rounds     Manual: MFR on gastroc/soleus area with ROM movements  MFR anterior ankle area with DF movements  MFR plantar aspect  Prone MFR with knee flexed working on IN/EV on foot  STJ mobs  MFR on gastroc/soleus area with ROM movements  MFR anterior ankle area with DF movements  MFR plantar aspect  Prone MFR with knee flexed working on IN/EV on foot  STJ mobs MFR on gastroc/soleus area with ROM movements  MFR anterior ankle area with DF movements  MFR plantar aspect  Prone MFR with knee flexed working on IN/EV on foot  STJ mobs  IASTM on the heeel area bilateral     Gait/NMRed:  Short arch SLS with a\ball throws 2x10 then standing AROM for ankle range Short arch SLS with a\ball throws 2x10   Single leg dead lift arch maintenance x10 each side       Modalities         HEP GIVEN: written copy given unless otherwise indicated   9/22/23: heel raises , hip ex,:declined need for pictures    Charges: thereaex x1 ( 12mins), man mob x2 (23) ,katianao yue x1 (10 mins)      Total Timed Treatment: 45 min  Total Treatment Time: 45min

## 2023-10-24 ENCOUNTER — APPOINTMENT (OUTPATIENT)
Dept: PHYSICAL THERAPY | Age: 33
End: 2023-10-24
Attending: ORTHOPAEDIC SURGERY
Payer: COMMERCIAL

## 2023-10-27 NOTE — PROGRESS NOTES
Dx:  Achilles tendinitis of both lower extremities (M76.61,M76.62          Insurance   PPO         Authorized  # visits by insurance  :  12   Expiration date  of Authorization:10/28/23 : insurance  Initial POC# of visits: 12       : POC: 12/26/23  Eval date/latest PN:9/14/23    Authorizing Physician: Dr. Amira Smith  Next MD visit: TBD  Fall Risk: standard         Precautions: none         Subjective: pt states that he still have issues with running and prolonged walking. He relates that he feels like everytime he goes to PT it helps a bit but come back. Pt state that that his worst pain is at 7/10, cease after  the activities. PAIN LEVEL:0/10  Assessment:   PT continued with MFR as noted below and  continued with  eccentric / loaded heel raises,  on 2 inch stool  started today and also advanced hip dynamic exercises      Objective: none this day        Goals:   goals addressed this day as noted above  Goals: to be met in 12 visits then will reassess      Pt will be I with HEP,its progression , demonstrating proper performance of exercises, >75% of the time to maintain progress in therapy   Pt will demonstrate improved strength on B ankle PF to half a grade or better for improved gait /stairs skills  Pt will demonstrate improved and pain free sagittal AROM   ankle for improved heel to toe gait pattern. Pt will report decreased in pain ,symptoms and functional limitations by 50% or better to be able to return to PLOF. Pt will demonstrate improved SLS on 30 secs ,painfree with perturbations to improve gait on various surfaces to return to PLOF safely. Pt will demonstrate ability to ambulate with symmetrical gait, equal stance >75% of the time . Pt will report <5/10 pain with work, recreational  and home activities such as walking   Plan: cont per POC     Date: 9/22/2023  TX#: 2/12 Date:         9/28/23      TX#: 3/12 Date:      10/4/243        TX#: 4/12 Date:               TX#: 5/   Date:    Tx#: 6/   Theraex: Inclined stretches 30x3 gastroc/soleus  Shuttle BLE heel raises 6 bands 2x10 then standing heel raises 2x10  SL hip abd 5# x10  Prone hip extension 5# x10 BLE: knee flexed and extended   Inclined stretches 30x3 gastroc/soleus  Shuttle BLE heel raises 6 bands 2x10 then single  raises 2x10 4 bands: eccentric   Standing x 10# 2x10 Inclined stretches 30x3 gastroc/soleus  Standing heel raises eccentric one leg each 2 inches stool 2x10    Sidesteps with blue band x3 rounds  Monster and retro walks BTB x 3 rounds     Manual: MFR on gastroc/soleus area with ROM movements  MFR anterior ankle area with DF movements  MFR plantar aspect  Prone MFR with knee flexed working on IN/EV on foot  STJ mobs  MFR on gastroc/soleus area with ROM movements  MFR anterior ankle area with DF movements  MFR plantar aspect  Prone MFR with knee flexed working on IN/EV on foot  STJ mobs MFR on gastroc/soleus area with ROM movements  MFR anterior ankle area with DF movements  MFR plantar aspect  Prone MFR with knee flexed working on IN/EV on foot  STJ mobs  IASTM on the heeel area bilateral     Gait/NMRed:  Short arch SLS with a\ball throws 2x10 then standing AROM for ankle range Short arch SLS with a\ball throws 2x10   Single leg dead lift arch maintenance x10 each side       Modalities         HEP GIVEN: written copy given unless otherwise indicated   9/22/23: heel raises , hip ex,:declined need for pictures    Charges: thereaex x1 ( 12mins), man mob x2 (23) ,katianao yue x1 (10 mins)      Total Timed Treatment: 45 min  Total Treatment Time: 45min

## 2023-10-31 ENCOUNTER — APPOINTMENT (OUTPATIENT)
Dept: PHYSICAL THERAPY | Age: 33
End: 2023-10-31
Attending: ORTHOPAEDIC SURGERY
Payer: COMMERCIAL

## 2023-11-07 ENCOUNTER — APPOINTMENT (OUTPATIENT)
Dept: PHYSICAL THERAPY | Age: 33
End: 2023-11-07
Attending: ORTHOPAEDIC SURGERY
Payer: COMMERCIAL

## 2024-01-22 ENCOUNTER — HOSPITAL ENCOUNTER (OUTPATIENT)
Age: 34
Discharge: HOME | End: 2024-01-22
Payer: COMMERCIAL

## 2024-01-22 VITALS
HEART RATE: 63 BPM | DIASTOLIC BLOOD PRESSURE: 95 MMHG | SYSTOLIC BLOOD PRESSURE: 148 MMHG | BODY MASS INDEX: 29.26 KG/M2 | HEIGHT: 74 IN | OXYGEN SATURATION: 97 % | RESPIRATION RATE: 18 BRPM | WEIGHT: 228 LBS | TEMPERATURE: 98 F

## 2024-01-22 DIAGNOSIS — R21 PENILE RASH: Primary | ICD-10-CM

## 2024-01-22 PROCEDURE — 99213 OFFICE O/P EST LOW 20 MIN: CPT | Performed by: NURSE PRACTITIONER

## 2024-01-22 RX ORDER — ALBUTEROL SULFATE 90 UG/1
2 AEROSOL, METERED RESPIRATORY (INHALATION) EVERY 4 HOURS PRN
Qty: 1 EACH | Refills: 0 | Status: SHIPPED | OUTPATIENT
Start: 2024-01-22 | End: 2024-02-21

## 2024-01-22 RX ORDER — TRIAMCINOLONE ACETONIDE 1 MG/G
1 OINTMENT TOPICAL 2 TIMES DAILY
Qty: 1 EACH | Refills: 0 | Status: SHIPPED | OUTPATIENT
Start: 2024-01-22 | End: 2024-02-01

## 2024-01-22 NOTE — DISCHARGE INSTRUCTIONS
Keep the area clean and hydrated.  Use the ointment as prescribed.  Close follow-up with dermatology as provided recommended.  Seek reevaluation in the emergency room for any new or worsening symptoms

## 2024-01-22 NOTE — ED PROVIDER NOTES
Patient Seen in: [facility]    History   CC: rash  HPI: Dav Washburn 33 year old male  who presents c/o rash to the shaft of his penis since 2018. +itchy. Denies pain. Hx of using steroid ointment with some relief. Denies penile dx. denies abdominal pain, nausea, vomiting, diarrhea, constipation, urinary changes/complaints, rash elsewhere.  States he believes this is exacerbated when he switches soaps or uses products containing fragrance to that area.  Denies testicular pain or swelling.  Sexual monogamous with a single female partner and denies concern for STI.    Past Medical History:   Diagnosis Date    Hypercholesterolemia     Mild intermittent asthma        Past Surgical History:   Procedure Laterality Date    INGUINAL HERNIA REPAIR Right 2015       Family History   Problem Relation Age of Onset    Prostate Cancer Father     Other (Lung Cancer) Maternal Grandmother     Pacemaker Maternal Grandfather     Diabetes Maternal Uncle        Social History     Socioeconomic History    Marital status: Single   Tobacco Use    Smoking status: Never     Passive exposure: Never    Smokeless tobacco: Never   Substance and Sexual Activity    Alcohol use: Never    Drug use: Never   Other Topics Concern    History of tanning No    Reaction to local anesthetic No    Pt has a pacemaker No    Pt has a defibrillator No       ROS:  Review of Systems    Positive for stated complaint: Eval-G  Other systems are as noted in HPI.  Constitutional and vital signs reviewed.      All other systems reviewed and negative except as noted above.    PSFH elements reviewed from today and agreed except as otherwise stated in HPI.             Constitutional and vital signs reviewed.        Physical Exam     ED Triage Vitals [01/22/24 1325]   BP (!) 148/95   Pulse 63   Resp 18   Temp 98.2 °F (36.8 °C)   Temp src Oral   SpO2 97 %   O2 Device None (Room air)       Current:BP (!) 148/95   Pulse 63   Temp 98.2 °F (36.8 °C) (Oral)   Resp 18    Ht 188 cm (6' 2\")   Wt 103.4 kg   SpO2 97%   BMI 29.27 kg/m²         PE:  General - Appears well, non-toxic and in NAD  Head - Appears symmetrical without deformity/swelling cranium, scalp, or facial bones  GI - Appears round and flat, BS +x4 quadrants, no tenderness/guarding with palpation   - No CVA tenderness, +focal area of raised hyperpigmented lesion noted to penile shaft along with surrounding dry skin. No dx, area of Fluctuance.  Skin -see  exam note.  Skin is otherwise pink warm and dry throughout, mmm, cap refill <2seconds  Neuro - A&O x4, sensation equal to both medial and lateral aspects of extremities, steady gait  MSK - makes purposeful movements of all extremities  Psych - Interactive and appropriate      ED Course   Labs Reviewed - No data to display    MDM     Discussed with patient general dermatitis/atopic dermatitis, triamcinolone ointment as prescribed and application instructions, hydration and lubrication instructions reviewed.  Hygiene instructions reviewed.  Chart review showsPCP lab results last performed 2/1/2023 with STI workup negative.  Patient denies new rash since 2018 however waxes and wanes in intensity.  Advised follow-up with dermatology  as well as ED/return precautions reviewed.  Patient demonstrates understanding of instruction and agrees with plan of care.      Disposition and Plan     Clinical Impression:  1. Penile rash        Disposition:  Discharge    Follow-up:  Chris Oscar DO  [address]  [address]  [facility] [address]  [phone]          Erik Lerma MD  [address]  [address]  [facility] [address] [phone]    Schedule an appointment as soon as possible for a visit in 2 days        Medications Prescribed:  Current Discharge Medication List        START taking these medications    Details   triamcinolone 0.1 % External Ointment Apply 1 each topically 2 (two) times daily for 10 days.  Qty: 1 each, Refills: 0

## 2024-03-28 ENCOUNTER — HOSPITAL ENCOUNTER (OUTPATIENT)
Age: 34
Discharge: HOME | End: 2024-03-28
Payer: COMMERCIAL

## 2024-03-28 VITALS
TEMPERATURE: 98 F | SYSTOLIC BLOOD PRESSURE: 152 MMHG | DIASTOLIC BLOOD PRESSURE: 89 MMHG | HEART RATE: 78 BPM | OXYGEN SATURATION: 99 % | RESPIRATION RATE: 18 BRPM

## 2024-03-28 DIAGNOSIS — Z11.3 SCREEN FOR STD (SEXUALLY TRANSMITTED DISEASE): Primary | ICD-10-CM

## 2024-03-28 PROCEDURE — 87591 N.GONORRHOEAE DNA AMP PROB: CPT | Performed by: NURSE PRACTITIONER

## 2024-03-28 PROCEDURE — 99213 OFFICE O/P EST LOW 20 MIN: CPT | Performed by: NURSE PRACTITIONER

## 2024-03-28 PROCEDURE — 87491 CHLMYD TRACH DNA AMP PROBE: CPT | Performed by: NURSE PRACTITIONER

## 2024-03-28 PROCEDURE — 87661 TRICHOMONAS VAGINALIS AMPLIF: CPT | Performed by: NURSE PRACTITIONER

## 2024-03-28 NOTE — ED PROVIDER NOTES
Patient Seen in: [facility]      History     Chief Complaint   Patient presents with    Std Screen     Entered by patient    Flip     Stated Complaint: Std Screen    Subjective:   32 y/o male with medical conditions as noted below presents requesting STI testing.  Patient states has not been sexually active for 2 months and does not have any symptoms.  Was not told by his partner that she had any symptoms.  Patient states may be switching partners and he just wanted to be tested prior.  No other complaints            Objective:   Past Medical History:   Diagnosis Date    Hypercholesterolemia     Mild intermittent asthma (HCC)               Past Surgical History:   Procedure Laterality Date    INGUINAL HERNIA REPAIR Right 2015                Social History     Socioeconomic History    Marital status: Single   Tobacco Use    Smoking status: Never     Passive exposure: Never    Smokeless tobacco: Never   Substance and Sexual Activity    Alcohol use: Never    Drug use: Never   Other Topics Concern    History of tanning No    Reaction to local anesthetic No    Pt has a pacemaker No    Pt has a defibrillator No              Review of Systems   Constitutional:  Negative for chills and fever.   Gastrointestinal:  Negative for abdominal pain, nausea and vomiting.   Genitourinary:  Negative for dysuria, flank pain, frequency, hematuria, penile discharge and urgency.   All other systems reviewed and are negative.      Positive for stated complaint: Std Screen  Other systems are as noted in HPI.  Constitutional and vital signs reviewed.      All other systems reviewed and negative except as noted above.    Physical Exam     ED Triage Vitals [03/28/24 0848]   BP (!) 157/93   Pulse 78   Resp 18   Temp 98 °F (36.7 °C)   Temp src Temporal   SpO2 99 %   O2 Device None (Room air)       Current:/89   Pulse 78   Temp 98 °F (36.7 °C) (Temporal)   Resp 18   SpO2 99%         Physical Exam  Vitals and nursing  note reviewed.   Constitutional:       General: He is not in acute distress.     Appearance: Normal appearance. He is not ill-appearing.   Cardiovascular:      Rate and Rhythm: Normal rate and regular rhythm.   Pulmonary:      Effort: Pulmonary effort is normal.   Genitourinary:     Comments: Deferred  Musculoskeletal:         General: Normal range of motion.   Skin:     General: Skin is warm and dry.   Neurological:      Mental Status: He is alert and oriented to person, place, and time.   Psychiatric:         Behavior: Behavior is cooperative.               ED Course     Labs Reviewed   TRICH VAG BY AJ   CHLAMYDIA/GONOCOCCUS, AJ                      MDM            Medical Decision Making  Patient is well-appearing. In NAD  Urine STI cultures pending  Discussed with patient STIs versus UTI  Patient importance of close follow-up with his primary doctor or an STI clinic to have additional testing done like HIV, hep B, HSV, syphilis        Problems Addressed:  Screen for STD (sexually transmitted disease): acute illness or injury    Amount and/or Complexity of Data Reviewed  Labs: ordered. Decision-making details documented in ED Course.        Disposition and Plan     Clinical Impression:  1. Screen for STD (sexually transmitted disease)         Disposition:  Discharge  3/28/2024  8:58 am    Follow-up:  Chris Oscar DO  [address] [place] [id number]  [phone]    Schedule an appointment as soon as possible for a visit   For additional STD testing          Medications Prescribed:  Discharge Medication List as of 3/28/2024  9:00 AM

## 2024-03-28 NOTE — ED INITIAL ASSESSMENT (HPI)
Pt here for STI check. Pt denies any symptoms or exposure. Pt states \"I am not sexually active but I still wanting to get checked.\"

## 2024-03-29 LAB
C TRACH DNA SPEC QL NAA+PROBE: NEGATIVE
N GONORRHOEA DNA SPEC QL NAA+PROBE: NEGATIVE

## 2024-03-30 LAB — TRICH VAG NAA: NEGATIVE

## 2024-04-02 ENCOUNTER — OFFICE VISIT (OUTPATIENT)
Dept: PODIATRY CLINIC | Facility: CLINIC | Age: 34
End: 2024-04-02
Payer: COMMERCIAL

## 2024-04-02 VITALS — SYSTOLIC BLOOD PRESSURE: 140 MMHG | DIASTOLIC BLOOD PRESSURE: 98 MMHG | HEART RATE: 87 BPM

## 2024-04-02 DIAGNOSIS — M72.2 PLANTAR FASCIITIS: Primary | ICD-10-CM

## 2024-04-02 RX ORDER — DEXAMETHASONE SODIUM PHOSPHATE 4 MG/ML
4 VIAL (ML) INJECTION ONCE
Status: COMPLETED | OUTPATIENT
Start: 2024-04-02 | End: 2024-04-02

## 2024-04-02 RX ORDER — TRIAMCINOLONE ACETONIDE 40 MG/ML
40 INJECTION, SUSPENSION INTRA-ARTICULAR; INTRAMUSCULAR ONCE
Status: COMPLETED | OUTPATIENT
Start: 2024-04-02 | End: 2024-04-02

## 2024-04-02 RX ADMIN — DEXAMETHASONE SODIUM PHOSPHATE 4 MG: 4 MG/ML VIAL (ML) INJECTION at 17:20:00

## 2024-04-02 RX ADMIN — TRIAMCINOLONE ACETONIDE 40 MG: 40 INJECTION, SUSPENSION INTRA-ARTICULAR; INTRAMUSCULAR at 17:20:00

## 2024-04-02 NOTE — PROGRESS NOTES
[facility]  Progress Note      Dav Washburn is a 33 year old male.   Chief Complaint   Patient presents with    Foot Pain     F/u - bilateral heel pain - cortisone worked last time, would like cortisone shots in both heels - constant pain, onset a few days - heel inserts he has do not work             HPI:     Patient is a pleasant 33-year-old male who presents to clinic for bilateral heel pain.  Admits that the cortisone injections he received last October provided him with good relief until now.  Patient is now experiencing pain again with walking and pressure.  Patient would like to have another set of cortisone injection.      Allergies: Patient has no known allergies.    Current Outpatient Medications   Medication Sig Dispense Refill    benzonatate 100 MG Oral Cap Take 1 capsule (100 mg total) by mouth 3 (three) times daily as needed. 10 capsule 0    albuterol 108 (90 Base) MCG/ACT Inhalation Aero Soln Inhale 1-2 puffs into the lungs every 4 to 6 hours as needed. 8.5 each 1    ibuprofen 800 MG Oral Tab Take 1 tablet (800 mg total) by mouth every 8 (eight) hours as needed for Pain. 60 tablet 1    diclofenac 1 % External Gel Apply 2 g topically 4 (four) times daily. 50 g 1    naproxen 500 MG Oral Tab Take 1 tablet (500 mg total) by mouth 2 (two) times daily with meals. 60 tablet 0    methylPREDNISolone 4 MG Oral Tablet Therapy Pack Take per package insert (instructions). Take as directed on the box (Patient not taking: Reported on 4/2/2024) 21 tablet 0      Past Medical History:   Diagnosis Date    Hypercholesterolemia     Mild intermittent asthma (HCC)       Past Surgical History:   Procedure Laterality Date    INGUINAL HERNIA REPAIR Right 2015      Family History   Problem Relation Age of Onset    Prostate Cancer Father     Other (Lung Cancer) Maternal Grandmother     Pacemaker Maternal Grandfather     Diabetes Maternal Uncle       Social History     Socioeconomic History    Marital status: Single    Tobacco Use    Smoking status: Never     Passive exposure: Never    Smokeless tobacco: Never   Substance and Sexual Activity    Alcohol use: Never    Drug use: Never   Other Topics Concern    History of tanning No    Reaction to local anesthetic No    Pt has a pacemaker No    Pt has a defibrillator No           REVIEW OF SYSTEMS:     Denies nause, fever, chills  No calf pain  Denies chest pain or SOB      EXAM:   BP (!) 140/98 (BP Location: Right arm, Patient Position: Sitting, Cuff Size: large)   Pulse 87   GENERAL: well developed, well nourished, in no apparent distress  EXTREMITIES:   1. Integument: Normal skin temperature and turgor  2. Vascular: Dorsalis pedis two out of four bilateral and posterior tibial pulses two out of   four bilateral, capillary refill normal.   3. Musculoskeletal: All muscle groups are graded 5 out of 5 in the foot and ankle. Pain with garry medial calcaneal tubercle.    4. Neurological: Normal sharp dull sensation; reflexes normal.             ASSESSMENT AND PLAN:   Diagnoses and all orders for this visit:    Plantar fasciitis  -     dexamethasone (Decadron) 4 MG/ML injection 4 mg  -     triamcinolone acetonide (Kenalog-40) 40 MG/ML injection 40 mg  -     dexamethasone (Decadron) 4 MG/ML injection 4 mg  -     triamcinolone acetonide (Kenalog-40) 40 MG/ML injection 40 mg          Plan:     -Patient examined, chart history reviewed.  -Discussed etiology of patient's condition and various treatment options.  -Discussed importance of proper shoe gear and orthotics.  Patient to avoid walking barefoot at all times.  -Discussed importance of stretching exercises.  Patient to aim to stretch 3-5 times daily.  -Discussed steroid injection with patient including benefits and risks.  Patient agrees to injection and written consent was obtained.  -After prepping site with alcohol, administered steroid injection to bilateral medial plantar heels consisting of 1 cc of 1% lidocaine plain, 1 cc of  dexamethasone, and  1 cc of Kenalog 40 to each.  Patient tolerated the injection well and there were no complications.  Site was dressed with Band-Aid.  -Wear supportive shoe gear and inserts at all times with ambulation.  Avoid walking barefoot.   - Monitor response to steroid injection.  - Follow-up in 2 months for reevaluation.      The patient indicates understanding of these issues and agrees to the plan.        Karin Flores DPM

## 2024-04-03 NOTE — PROGRESS NOTES
Verbal order per Dr Swift, draw up 2ml of 1% Lidiocaine, 2ml of Dexamethasone Sodium Phosphate and 2ml of Kenalog 40, for injections to right and left heels. Tamia ZAMORA RN  Patient given cortisone information handout.

## 2024-04-04 ENCOUNTER — TELEPHONE (OUTPATIENT)
Dept: PODIATRY CLINIC | Facility: CLINIC | Age: 34
End: 2024-04-04

## 2024-04-04 NOTE — TELEPHONE ENCOUNTER
S/w patient- He received cortisone injection on bilateral heels on 4/2/24. Patient states that heels are still painful after injection. He endorses mild swelling, He states that he is taking ibuprofen with minimal relief. I asked if he is icing the feet and he responded that he is not. I advised doing that for heel. He was wondering what other medications he could take for pain. It did look like her received steroid pack after last injection to heel on 10/12/23.    Please advise on sending another steroid pack for patient

## 2024-04-04 NOTE — TELEPHONE ENCOUNTER
Per pt taking ibuprofen for his heel pain and states it is not helping.  Per pt requesting pain mediation. Please advise

## 2024-04-05 RX ORDER — NAPROXEN 500 MG/1
500 TABLET ORAL 2 TIMES DAILY WITH MEALS
Qty: 60 TABLET | Refills: 0 | Status: SHIPPED | OUTPATIENT
Start: 2024-04-05 | End: 2024-05-05

## 2024-04-05 NOTE — TELEPHONE ENCOUNTER
Called pt and informed him per Dr Flores orders. Advised pt to not take with any other anti-inflammatories(NSAIDS) such as ibuprofen/advil, aleve, diclofenac/Voltaren, etodolac, meloxicam/mobic. Stop medication if stomach upset occurs. He verbalized understanding.

## 2024-06-07 RX ORDER — NAPROXEN 500 MG/1
500 TABLET ORAL 2 TIMES DAILY WITH MEALS
Qty: 60 TABLET | Refills: 0 | Status: SHIPPED
Start: 2024-06-07

## 2024-06-07 NOTE — TELEPHONE ENCOUNTER
I tried to E prescribe patient's prescription, but got an error note.  Please let me know if anything further is needed.

## 2024-06-07 NOTE — TELEPHONE ENCOUNTER
Last prescription 4/5/24 60# 0 refill  LOV 4/2/24  No follow up    Please sign pended order if appropriate

## 2024-10-03 ENCOUNTER — HOSPITAL ENCOUNTER (EMERGENCY)
Facility: HOSPITAL | Age: 34
Discharge: HOME | End: 2024-10-03
Attending: EMERGENCY MEDICINE
Payer: COMMERCIAL

## 2024-10-03 ENCOUNTER — TELEPHONE (OUTPATIENT)
Dept: ORTHOPEDICS CLINIC | Facility: CLINIC | Age: 34
End: 2024-10-03

## 2024-10-03 ENCOUNTER — TELEPHONE (OUTPATIENT)
Dept: CASE MANAGEMENT | Facility: HOSPITAL | Age: 34
End: 2024-10-03

## 2024-10-03 VITALS
HEIGHT: 74 IN | RESPIRATION RATE: 17 BRPM | DIASTOLIC BLOOD PRESSURE: 97 MMHG | TEMPERATURE: 97 F | HEART RATE: 71 BPM | BODY MASS INDEX: 29.52 KG/M2 | WEIGHT: 230 LBS | SYSTOLIC BLOOD PRESSURE: 149 MMHG | OXYGEN SATURATION: 97 %

## 2024-10-03 DIAGNOSIS — G89.29 CHRONIC PAIN IN LEFT FOOT: Primary | ICD-10-CM

## 2024-10-03 DIAGNOSIS — M79.672 CHRONIC PAIN IN LEFT FOOT: Primary | ICD-10-CM

## 2024-10-03 DIAGNOSIS — R52 PAIN: Primary | ICD-10-CM

## 2024-10-03 PROCEDURE — 96372 THER/PROPH/DIAG INJ SC/IM: CPT

## 2024-10-03 PROCEDURE — 99283 EMERGENCY DEPT VISIT LOW MDM: CPT

## 2024-10-03 RX ORDER — METHYLPREDNISOLONE 4 MG
TABLET, DOSE PACK ORAL
Qty: 21 TABLET | Refills: 0 | Status: SHIPPED | OUTPATIENT
Start: 2024-10-03

## 2024-10-03 RX ORDER — PREDNISONE 20 MG/1
60 TABLET ORAL ONCE
Status: COMPLETED | OUTPATIENT
Start: 2024-10-03 | End: 2024-10-03

## 2024-10-03 RX ORDER — KETOROLAC TROMETHAMINE 30 MG/ML
30 INJECTION, SOLUTION INTRAMUSCULAR; INTRAVENOUS ONCE
Status: COMPLETED | OUTPATIENT
Start: 2024-10-03 | End: 2024-10-03

## 2024-10-03 RX ORDER — IBUPROFEN 600 MG/1
600 TABLET, FILM COATED ORAL EVERY 8 HOURS PRN
Qty: 20 TABLET | Refills: 0 | Status: SHIPPED | OUTPATIENT
Start: 2024-10-03 | End: 2024-10-10

## 2024-10-03 NOTE — ED INITIAL ASSESSMENT (HPI)
Pt ambulatory to ED A&O x 4 w/ c/o pain to L achilles tendon area.  Pt reporting previous injury from football approx 10 years ago.  Pt denies recent injury or trauma.  Pt reporting he sees podiatry for this issue, but has been unable to get in for an appt.

## 2024-10-03 NOTE — CM/SW NOTE
Patient called from [facility] for a same day Ortho appointment.  Advised patient that same day Ortho appointments are normally not available unless, by chance, they have a cancellation

## 2024-10-03 NOTE — DISCHARGE INSTRUCTIONS
Follow-up with your primary care provider 1 to 2 days.  Given podiatry referral since he cannot get all of his own call to schedule an appointment.  Take medications as prescribed.  Follow-up with your primary care provider 1 to 2 days.  Return to the ER if symptoms continue, get worse, unable to follow-up

## 2024-10-03 NOTE — ED PROVIDER NOTES
Patient Seen in: [facility] Emergency Department    History     Chief Complaint   Patient presents with    Leg or Foot Injury       HPI    34-year-old male with history of chronic foot pain who is pending podiatry surgical relief presents ER today because he states both of his podiatrist are out of town patient asking for a cortisone shot to his Achilles.  Denies any injury or trauma.  No swelling or redness.    History reviewed.   Past Medical History:    Hypercholesterolemia    Mild intermittent asthma (HCC)       History reviewed.   Past Surgical History:   Procedure Laterality Date    Inguinal hernia repair Right 2015         Medications :  (Not in a hospital admission)       Family History   Problem Relation Age of Onset    Prostate Cancer Father     Other (Lung Cancer) Maternal Grandmother     Pacemaker Maternal Grandfather     Diabetes Maternal Uncle        Smoking Status:   Social History     Socioeconomic History    Marital status: Single   Tobacco Use    Smoking status: Never     Passive exposure: Never    Smokeless tobacco: Never   Vaping Use    Vaping status: Never Used   Substance and Sexual Activity    Alcohol use: Never    Drug use: Never   Other Topics Concern    History of tanning No    Reaction to local anesthetic No    Pt has a pacemaker No    Pt has a defibrillator No       Constitutional and vital signs reviewed.      Social History and Family History elements reviewed from today, pertinent positives to the presenting problem noted.    Physical Exam     ED Triage Vitals [10/03/24 0950]   BP (!) 149/97   Pulse 71   Resp 17   Temp 97.3 °F (36.3 °C)   Temp src Temporal   SpO2 97 %   O2 Device None (Room air)       All measures to prevent infection transmission during my interaction with the patient were taken. Handwashing was performed prior to and after the exam.  Stethoscope and any equipment used during my examination was cleaned with super sani-cloth germicidal wipes following the  exam.     Physical Exam  Vitals and nursing note reviewed.   Cardiovascular:      Rate and Rhythm: Normal rate.   Pulmonary:      Effort: Pulmonary effort is normal.   Skin:     General: Skin is warm and dry.      Capillary Refill: Capillary refill takes less than 2 seconds.   Neurological:      General: No focal deficit present.         ED Course      Labs Reviewed - No data to display    As Interpreted by me    Imaging Results Available and Reviewed while in ED: No results found.  ED Medications Administered:   Medications   ketorolac (Toradol) 30 MG/ML injection 30 mg (has no administration in time range)   predniSONE (Deltasone) tab 60 mg (has no administration in time range)         MDM     Vitals:    10/03/24 0950   BP: (!) 149/97   Pulse: 71   Resp: 17   Temp: 97.3 °F (36.3 °C)   TempSrc: Temporal   SpO2: 97%   Weight: 104.3 kg   Height: 188 cm (6' 2\")     *I personally reviewed and interpreted all ED vitals.    Pulse Ox: 97%, Room air, Normal       Differential Diagnosis/ Diagnostic Considerations: Fracture, tendinitis, cellulitis    Complicating Factors: The patient already has does not have any pertinent problems on file. to contribute to the complexity of this ED evaluation.    Medical Decision Making    I explained the patient we do not do cortisone shots in the ED.  Will give a Medrol Dosepak and ibuprofen for home.  Given a dose of Toradol and prednisone here in the ER to start the treatment.  Patient does understand to follow-up with his podiatrist soon as possible.  Given referral to for another podiatrist in case it takes too long and needs to follow-up with one as soon as possible.  Patient should also follow-up with his primary care provider in 1 to 2 days.  Return to the ER if symptoms continue, get worse, unable to follow-up  Condition upon leaving the department: Stable    Disposition and Plan     Clinical Impression:  1. Chronic pain in left foot         Disposition:  Discharge    Follow-up:  Chris Oscar DO  [address] [facility] [address]  [facility] [address]  [phone]    Follow up      Eduardo Aranda DPM  [address] [address]  [phone]    Follow up        Medications Prescribed:  Current Discharge Medication List        START taking these medications    Details   !! ibuprofen 600 MG Oral Tab Take 1 tablet (600 mg total) by mouth every 8 (eight) hours as needed for Pain or Fever.  Qty: 20 tablet, Refills: 0      !! methylPREDNISolone 4 MG Oral Tablet Therapy Pack Take as directed on dose pack instructions  Qty: 21 tablet, Refills: 0       !! - Potential duplicate medications found. Please discuss with provider.

## 2024-12-06 ENCOUNTER — ORDER TRANSCRIPTION (OUTPATIENT)
Dept: PHYSICAL THERAPY | Facility: HOSPITAL | Age: 34
End: 2024-12-06

## 2024-12-06 DIAGNOSIS — M72.2 PLANTAR FASCIAL FIBROMATOSIS: Primary | ICD-10-CM

## 2024-12-09 ENCOUNTER — TELEPHONE (OUTPATIENT)
Dept: PHYSICAL THERAPY | Facility: HOSPITAL | Age: 34
End: 2024-12-09

## 2024-12-18 ENCOUNTER — TELEPHONE (OUTPATIENT)
Dept: PHYSICAL THERAPY | Facility: HOSPITAL | Age: 34
End: 2024-12-18

## 2025-01-14 ENCOUNTER — TELEPHONE (OUTPATIENT)
Dept: PHYSICAL THERAPY | Facility: HOSPITAL | Age: 35
End: 2025-01-14

## 2025-01-14 ENCOUNTER — APPOINTMENT (OUTPATIENT)
Dept: PHYSICAL THERAPY | Age: 35
End: 2025-01-14
Attending: PODIATRIST
Payer: COMMERCIAL

## 2025-01-16 ENCOUNTER — APPOINTMENT (OUTPATIENT)
Dept: PHYSICAL THERAPY | Age: 35
End: 2025-01-16
Attending: PODIATRIST
Payer: COMMERCIAL

## 2025-01-21 ENCOUNTER — APPOINTMENT (OUTPATIENT)
Dept: PHYSICAL THERAPY | Age: 35
End: 2025-01-21
Attending: PODIATRIST
Payer: COMMERCIAL

## 2025-01-23 ENCOUNTER — APPOINTMENT (OUTPATIENT)
Dept: PHYSICAL THERAPY | Age: 35
End: 2025-01-23
Attending: PODIATRIST
Payer: COMMERCIAL

## 2025-02-04 ENCOUNTER — APPOINTMENT (OUTPATIENT)
Dept: PHYSICAL THERAPY | Age: 35
End: 2025-02-04
Attending: PODIATRIST
Payer: COMMERCIAL

## 2025-02-09 ENCOUNTER — HOSPITAL ENCOUNTER (OUTPATIENT)
Age: 35
Discharge: HOME | End: 2025-02-09
Payer: COMMERCIAL

## 2025-02-09 VITALS
SYSTOLIC BLOOD PRESSURE: 156 MMHG | TEMPERATURE: 98 F | RESPIRATION RATE: 16 BRPM | HEART RATE: 70 BPM | DIASTOLIC BLOOD PRESSURE: 87 MMHG | OXYGEN SATURATION: 99 %

## 2025-02-09 DIAGNOSIS — J30.89 OTHER ALLERGIC RHINITIS: ICD-10-CM

## 2025-02-09 DIAGNOSIS — R05.8 NOCTURNAL COUGH: ICD-10-CM

## 2025-02-09 DIAGNOSIS — R05.3 CHRONIC COUGH: Primary | ICD-10-CM

## 2025-02-09 PROCEDURE — 99214 OFFICE O/P EST MOD 30 MIN: CPT | Performed by: NURSE PRACTITIONER

## 2025-02-09 RX ORDER — BENZONATATE 100 MG/1
100 CAPSULE ORAL 3 TIMES DAILY PRN
Qty: 30 CAPSULE | Refills: 1 | Status: SHIPPED | OUTPATIENT
Start: 2025-02-09

## 2025-02-09 RX ORDER — CODEINE PHOSPHATE AND GUAIFENESIN 10; 100 MG/5ML; MG/5ML
5 SOLUTION ORAL NIGHTLY PRN
Qty: 118 ML | Refills: 0 | Status: SHIPPED | OUTPATIENT
Start: 2025-02-09 | End: 2025-02-09

## 2025-02-09 RX ORDER — ALBUTEROL SULFATE 90 UG/1
1-2 INHALANT RESPIRATORY (INHALATION)
Qty: 8.5 EACH | Refills: 1 | Status: SHIPPED | OUTPATIENT
Start: 2025-02-09 | End: 2025-02-09

## 2025-02-09 RX ORDER — BENZONATATE 100 MG/1
100 CAPSULE ORAL 3 TIMES DAILY PRN
Qty: 30 CAPSULE | Refills: 1 | Status: SHIPPED | OUTPATIENT
Start: 2025-02-09 | End: 2025-02-09

## 2025-02-09 RX ORDER — CODEINE PHOSPHATE AND GUAIFENESIN 10; 100 MG/5ML; MG/5ML
5 SOLUTION ORAL NIGHTLY PRN
Qty: 118 ML | Refills: 0 | Status: SHIPPED | OUTPATIENT
Start: 2025-02-09

## 2025-02-09 RX ORDER — ALBUTEROL SULFATE 90 UG/1
1-2 INHALANT RESPIRATORY (INHALATION)
Qty: 8.5 EACH | Refills: 1 | Status: SHIPPED | OUTPATIENT
Start: 2025-02-09

## 2025-02-09 NOTE — ED PROVIDER NOTES
History     Chief Complaint   Patient presents with    Cough     Chronic cough - Entered by patient       Subjective:   GURWINDER    Dav Washburn, 34 year old male with notable medical history of chronic cough, intermittent asthma, nasal turbinate hypertrophy who presents with cough. Patient reports chronic cough that is persistent throughout the day and night. Denies SOB, fever, chills, URI symptoms. Denies Hx tobacco use. He reports improvement from Tessalon pearls in the past, but current cough is a little worse than typical.      Patient Active Problem List   Diagnosis    Mild intermittent asthma (HCC)    Hypercholesterolemia    Flat foot    Other allergic rhinitis    Hypertrophy, nasal, turbinate    Mixed hyperlipidemia    Hyperglycemia    Leukopenia    Achilles tendinitis of both lower extremities    Screen for STD (sexually transmitted disease)      Objective:   Past Medical History:    Asthma (HCC)    Hypercholesterolemia    Mild intermittent asthma (HCC)              Past Surgical History:   Procedure Laterality Date    Inguinal hernia repair Right 2015                Social History     Socioeconomic History    Marital status: Single   Tobacco Use    Smoking status: Never     Passive exposure: Never    Smokeless tobacco: Never   Vaping Use    Vaping status: Never Used   Substance and Sexual Activity    Alcohol use: Never    Drug use: Never   Other Topics Concern    History of tanning No    Reaction to local anesthetic No    Pt has a pacemaker No    Pt has a defibrillator No     Social Drivers of Health     Food Insecurity: Not on File (2024)    Received from [employer]    Food Insecurity     Food: 0   Transportation Needs: Not on File (10/18/2022)    Received from [facility]    Transportation Needs     Transportation: 0   Housing Stability: Not on File (10/18/2022)    Received from [facility]    Housing Stability     Housin              Medications Ordered Prior to Encounter[1]      Constitutional and  vital signs reviewed.      All other systems reviewed and negative except as noted above.    I have reviewed the family history, social history, allergies, and outpatient medications.     History reviewed from EMR: Encounters, problem list, allergies, medications      Physical Exam     ED Triage Vitals [02/09/25 1248]   /87   Pulse 70   Resp 16   Temp 98 °F (36.7 °C)   Temp src Oral   SpO2 99 %   O2 Device None (Room air)       Current:/87   Pulse 70   Temp 98 °F (36.7 °C) (Oral)   Resp 16   SpO2 99%       Physical Exam  Vitals and nursing note reviewed.   Constitutional:       General: He is not in acute distress.     Appearance: Normal appearance. He is normal weight. He is not ill-appearing or toxic-appearing.   HENT:      Head: Normocephalic and atraumatic.      Right Ear: External ear normal.      Left Ear: External ear normal.      Nose: Nose normal. No congestion or rhinorrhea.      Mouth/Throat:      Mouth: Mucous membranes are moist.   Eyes:      Extraocular Movements: Extraocular movements intact.      Conjunctiva/sclera: Conjunctivae normal.      Pupils: Pupils are equal, round, and reactive to light.   Cardiovascular:      Rate and Rhythm: Normal rate and regular rhythm.      Pulses: Normal pulses.      Heart sounds: Normal heart sounds.   Pulmonary:      Effort: Pulmonary effort is normal. No respiratory distress.      Comments: LS clear. No distress, wheezing, crackles.  Musculoskeletal:         General: No swelling, tenderness or signs of injury. Normal range of motion.      Cervical back: Normal range of motion.   Skin:     General: Skin is warm and dry.      Capillary Refill: Capillary refill takes less than 2 seconds.   Neurological:      General: No focal deficit present.      Mental Status: He is alert and oriented to person, place, and time. Mental status is at baseline.   Psychiatric:         Mood and Affect: Mood normal.         Behavior: Behavior normal.         Thought  Content: Thought content normal.         Judgment: Judgment normal.            ED Course     Labs Reviewed - No data to display  No orders to display       Vitals:    02/09/25 1248   BP: 156/87   Pulse: 70   Resp: 16   Temp: 98 °F (36.7 °C)   TempSrc: Oral   SpO2: 99%            MARIE Washburn, 34 year old male with medical history as noted above who presents with chronic cough   - Patient in Nad, VSS   - Benign cardiopulmonary and overall exam   - HPI and exam no c/w viral or bacterial process   - Pulmonology contact provided   - Supportive care and infection control measures discussed   - RTED/IC precautions discussed        ** See ED course below for additional information on care provided / interventions / notable events throughout patient's encounter.    ** See Home Care Instructions below for care measures to trial as applicable.         ** I have independently reviewed the radiology images, clinical lab results, and ECG tracings as described above (if applicable)    ** Concerning co-morbidities possibly affecting complaint / care: chronic cough    ** See disposition & plan section below for home care instructions - if applicable        Medical Decision Making  Risk  OTC drugs.  Prescription drug management.        Disposition and Plan     Disposition:  Discharge  2/9/2025  1:20 pm    Clinical Impression:  1. Chronic cough    2. Nocturnal cough    3. Other allergic rhinitis            Home care instructions:     - Use prescription cough medication as needed for cough (may make your drowsy - DO NOT take while driving)   - Use inhaler (1-2 puffs every 4-6 hours) with spacer as needed for chest tightness, wheezing, and/or bronchospasm    Cough / Sinus:   - Using saline spray or a couple drops into nostrils a couple times a day can help with sinus inflammation (Use nasal spray with nose forward and applicator tip pointed towards outside of eye. Breath normally. You should not feel medication go down your  throat.)   - Avoid having air blow on your face as this can worsen congestion / cough   - You may benefit from placing a garlic clove in each nostril for 10-15min which should irritate sinuses, causing you to get rid of stuck mucus. Blow your nose thoroughly afterwards   - You may benefit from spoonfuls of honey (and/or added to warm drinks) throughout the day for cough   - You may benefit from taking a decongestant (e.g. Sudafed - pseudoephedrine [behind the pharmacy counter]) (may temporarily elevate your heart rate and blood pressure)   - You may benefit from cough medication containing \"Dextromethorphan\" (e.g. Delsym)   - You may benefit from using a humidifier and/or steam showers   - You may benefit from Flonase nasal spray daily (Use with head tilted down and tip pointed towards outside of eye. Breath normally. You should not feel medication go down your throat)   - You may benefit from taking a daily allergy medication (e.g. Zyrtec, Xyzal, etc.)   - You may benefit from boiling water with lemon and cayenne pepper, then breathing in the steam (you can cover your head with a towel to help funnel the steam)    General Health Maintenance   - Overall goal: Improve body functioning through improved cellular health by decrease inflammation   - Move more (7k steps a day improves cardiovascular health - approx 1.5hrs of cumulative walking per day)   - Sleep better (consistent bedtime, sleep mask, magnesium glycinate)   - Eat \"real\" food (avoid high processed foods such as: snack foods, margarine, frozen foods, most breads)   - Avoid sugar which is very inflammatory and interferes with cellular function   - Avoid excessive dairy (milk, cheese, etc. [Yogurt is ok])   - Early exposure to daylight   - Drink more water (preferably filtered)   - Prioritize protein with every meal   - Supplement nutrient dietary deficiencies with multivitamins, extra vitamin D (2000IU), Magnesium glycinate, and probiotics daily, year  round   - Yearly health monitoring by your primary care provider     *individual modifications may be required      Follow-up:  Nas Celeste MD  [address] , [address] [address]  [phone]      Lung specialist        Medications Prescribed:  Discharge Medication List as of 2/9/2025  1:20 PM        START taking these medications    Details   guaiFENesin-codeine 100-10 MG/5ML Oral Solution Take 5 mL by mouth nightly as needed for cough., Normal, Disp-118 mL, [id number] to modify volume given availability               Richard Bhatia, DNP, APRN, AGACNP-BC, FNP-C, CNL  Adult-Gerontology Acute Care & Family Nurse Practitioner  [facility]      The above patient (and/or guardian) was made aware that an appropriate evaluation has been performed, and that no additional testing is required at this time. In my medical judgment, there is currently no evidence of an immediate life-threatening or surgical condition, therefore discharge is indicated at this time. The patient (and/or guardian) was advised that a small risk still exists that a serious condition could develop. The patient was instructed to arrange close follow-up with their primary care provider (or the referral provider given today). The patient received written and verbal instructions regarding their condition / concerns, demonstrated understanding, and is agreement with the outpatient treatment plan.              [1]   No current facility-administered medications on file prior to encounter.     Current Outpatient Medications on File Prior to Encounter   Medication Sig Dispense Refill    methylPREDNISolone 4 MG Oral Tablet Therapy Pack Take as directed on dose pack instructions 21 tablet 0    naproxen 500 MG Oral Tab Take 1 tablet (500 mg total) by mouth 2 (two) times daily with meals. (Patient not taking: Reported on 10/3/2024) 60 tablet 0    methylPREDNISolone 4 MG Oral Tablet Therapy Pack Take per package insert (instructions). Take  as directed on the box (Patient not taking: Reported on 4/2/2024) 21 tablet 0    ibuprofen 800 MG Oral Tab Take 1 tablet (800 mg total) by mouth every 8 (eight) hours as needed for Pain. (Patient not taking: Reported on 10/3/2024) 60 tablet 1    diclofenac 1 % External Gel Apply 2 g topically 4 (four) times daily. 50 g 1

## 2025-02-09 NOTE — ED INITIAL ASSESSMENT (HPI)
Pt presents with \"chronic cough\" x several weeks. Pt reports hx of bronchitis and asthma.     Pt requesitng cough medication.

## 2025-02-09 NOTE — DISCHARGE INSTRUCTIONS
- Use prescription cough medication as needed for cough (may make your drowsy - DO NOT take while driving)   - Use inhaler (1-2 puffs every 4-6 hours) with spacer as needed for chest tightness, wheezing, and/or bronchospasm    Cough / Sinus:   - Using saline spray or a couple drops into nostrils a couple times a day can help with sinus inflammation (Use nasal spray with nose forward and applicator tip pointed towards outside of eye. Breath normally. You should not feel medication go down your throat.)   - Avoid having air blow on your face as this can worsen congestion / cough   - You may benefit from placing a garlic clove in each nostril for 10-15min which should irritate sinuses, causing you to get rid of stuck mucus. Blow your nose thoroughly afterwards   - You may benefit from spoonfuls of honey (and/or added to warm drinks) throughout the day for cough   - You may benefit from taking a decongestant (e.g. Sudafed - pseudoephedrine [behind the pharmacy counter]) (may temporarily elevate your heart rate and blood pressure)   - You may benefit from cough medication containing \"Dextromethorphan\" (e.g. Delsym)   - You may benefit from using a humidifier and/or steam showers   - You may benefit from Flonase nasal spray daily (Use with head tilted down and tip pointed towards outside of eye. Breath normally. You should not feel medication go down your throat)   - You may benefit from taking a daily allergy medication (e.g. Zyrtec, Xyzal, etc.)   - You may benefit from boiling water with lemon and cayenne pepper, then breathing in the steam (you can cover your head with a towel to help funnel the steam)    General Health Maintenance   - Overall goal: Improve body functioning through improved cellular health by decrease inflammation   - Move more (7k steps a day improves cardiovascular health - approx 1.5hrs of cumulative walking per day)   - Sleep better (consistent bedtime, sleep mask, magnesium glycinate)   - Eat  \"real\" food (avoid high processed foods such as: snack foods, margarine, frozen foods, most breads)   - Avoid sugar which is very inflammatory and interferes with cellular function   - Avoid excessive dairy (milk, cheese, etc. [Yogurt is ok])   - Early exposure to daylight   - Drink more water (preferably filtered)   - Prioritize protein with every meal   - Supplement nutrient dietary deficiencies with multivitamins, extra vitamin D (2000IU), Magnesium glycinate, and probiotics daily, year round   - Yearly health monitoring by your primary care provider     *individual modifications may be required

## 2025-03-22 ENCOUNTER — HOSPITAL ENCOUNTER (EMERGENCY)
Facility: HOSPITAL | Age: 35
Discharge: HOME | End: 2025-03-22
Attending: EMERGENCY MEDICINE
Payer: COMMERCIAL

## 2025-03-22 ENCOUNTER — APPOINTMENT (OUTPATIENT)
Dept: GENERAL RADIOLOGY | Facility: HOSPITAL | Age: 35
End: 2025-03-22
Attending: EMERGENCY MEDICINE
Payer: COMMERCIAL

## 2025-03-22 VITALS
BODY MASS INDEX: 29.52 KG/M2 | WEIGHT: 230 LBS | SYSTOLIC BLOOD PRESSURE: 158 MMHG | HEART RATE: 81 BPM | RESPIRATION RATE: 16 BRPM | TEMPERATURE: 99 F | HEIGHT: 74 IN | DIASTOLIC BLOOD PRESSURE: 108 MMHG | OXYGEN SATURATION: 98 %

## 2025-03-22 DIAGNOSIS — S16.1XXA STRAIN OF NECK MUSCLE, INITIAL ENCOUNTER: Primary | ICD-10-CM

## 2025-03-22 DIAGNOSIS — S09.93XA DENTAL INJURY, INITIAL ENCOUNTER: ICD-10-CM

## 2025-03-22 PROCEDURE — 72050 X-RAY EXAM NECK SPINE 4/5VWS: CPT | Performed by: EMERGENCY MEDICINE

## 2025-03-22 PROCEDURE — 99283 EMERGENCY DEPT VISIT LOW MDM: CPT

## 2025-03-22 PROCEDURE — 99284 EMERGENCY DEPT VISIT MOD MDM: CPT

## 2025-03-22 RX ORDER — CYCLOBENZAPRINE HCL 10 MG
10 TABLET ORAL 3 TIMES DAILY PRN
Qty: 20 TABLET | Refills: 0 | Status: SHIPPED | OUTPATIENT
Start: 2025-03-22 | End: 2025-03-29

## 2025-03-22 NOTE — DISCHARGE INSTRUCTIONS
Tylenol or Advil for milder pain apply ice 20 minutes 4 times a day for more severe pain take the Flexeril up to 3 times a day this may make you drowsy do not drink or drive while taking it.  Follow-up with your dentist over the next few days.
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
## 2025-03-26 ENCOUNTER — TELEPHONE (OUTPATIENT)
Dept: CASE MANAGEMENT | Facility: HOSPITAL | Age: 35
End: 2025-03-26

## 2025-03-26 NOTE — CM/SW NOTE
Patient left a message that he did not get the work note he needs.  Work note sent to patient's MyChart.

## 2025-04-29 RX ORDER — NAPROXEN 500 MG/1
500 TABLET ORAL 2 TIMES DAILY WITH MEALS
Qty: 60 TABLET | Refills: 0 | OUTPATIENT
Start: 2025-04-29

## 2025-04-29 NOTE — TELEPHONE ENCOUNTER
Spoke w/ pt.  Pt name and  verified.  Pt requested refill Naproxen through pharmacy.  Pt LOV 24.  Pt informed would need follow up appt, pt wishes to schedule, would like to discuss possibility of another injection.  Pt requested a call back for open appt times, pt requested to call back if  at 2:40 PM at [place] location would work.  Pt informed can also cancel or reschedule appt through [employer].

## 2025-07-18 NOTE — PROGRESS NOTES
[facility]  Progress Note      Dav Washburn is a 34 year old male.   Chief Complaint   Patient presents with    Foot Pain     Bilateral arch - pain rated 10/10 - pt stated its hard to walk - on and off pain - sharp - throbbing - possible fungus in between toes             HPI:     Patient is a pleasant 34-year-old male who presents to clinic with plaints.  Patient admits to bilateral heel pain especially with walking and standing on and off rating his pain a 10 out of 10.  Patient does admit to completing physical therapy which did help slightly.  Today he also admits of a rash on the plantar aspect of his left foot as well as maceration to bilateral fourth interspaces.  Allergies: Patient has no known allergies.    Current Outpatient Medications   Medication Sig Dispense Refill    methylPREDNISolone 4 MG Oral Tablet Therapy Pack Take per package insert (instructions). Take as directed on the box 21 tablet 0    clotrimazole-betamethasone 1-0.05 % External Cream Apply 1 Application topically 2 (two) times daily. 45 g 3    albuterol 108 (90 Base) MCG/ACT Inhalation Aero Soln Inhale 1-2 puffs into the lungs every 4 to 6 hours as needed for Wheezing or Shortness of Breath. 8.5 each 1    benzonatate 100 MG Oral Cap Take 1 capsule (100 mg total) by mouth 3 (three) times daily as needed for cough. 30 capsule 1    guaiFENesin-codeine 100-10 MG/5ML Oral Solution Take 5 mL by mouth nightly as needed for cough. 118 mL 0    methylPREDNISolone 4 MG Oral Tablet Therapy Pack Take as directed on dose pack instructions 21 tablet 0    naproxen 500 MG Oral Tab Take 1 tablet (500 mg total) by mouth 2 (two) times daily with meals. 60 tablet 0    ibuprofen 800 MG Oral Tab Take 1 tablet (800 mg total) by mouth every 8 (eight) hours as needed for Pain. 60 tablet 1    diclofenac 1 % External Gel Apply 2 g topically 4 (four) times daily. 50 g 1      Past Medical History:    Asthma (HCC)    Hypercholesterolemia    Mild  intermittent asthma (HCC)      Past Surgical History:   Procedure Laterality Date    Inguinal hernia repair Right 2015      Family History   Problem Relation Age of Onset    Prostate Cancer Father     Other (Lung Cancer) Maternal Grandmother     Pacemaker Maternal Grandfather     Diabetes Maternal Uncle       Social History     Socioeconomic History    Marital status: Single   Tobacco Use    Smoking status: Never     Passive exposure: Never    Smokeless tobacco: Never   Vaping Use    Vaping status: Never Used   Substance and Sexual Activity    Alcohol use: Never    Drug use: Never   Other Topics Concern    History of tanning No    Reaction to local anesthetic No    Pt has a pacemaker No    Pt has a defibrillator No           REVIEW OF SYSTEMS:     Denies nause, fever, chills  No calf pain  Denies chest pain or SOB      EXAM:   There were no vitals taken for this visit.  GENERAL: well developed, well nourished, in no apparent distress  EXTREMITIES:   1. Integument: Normal skin temperature and turgor. Hazard plantar skin distribution to left plantar middle arch.  Skin maceration to bilateral fourth interspaces.  No signs of infection to bilateral feet.  2. Vascular: Dorsalis pedis two out of four bilateral and posterior tibial pulses two out of   four bilateral, capillary refill normal.   3. Musculoskeletal: All muscle groups are graded 5 out of 5 in the foot and ankle. Pain with garry medial calcaneal tubercle.    4. Neurological: Normal sharp dull sensation; reflexes normal.             ASSESSMENT AND PLAN:   Diagnoses and all orders for this visit:    Plantar fasciitis  -     Physical Therapy Referral - [facility]    Achilles tendinitis of both lower extremities  -     Physical Therapy Referral - [facility]    Tinea pedis of both feet    Other orders  -     methylPREDNISolone 4 MG Oral Tablet Therapy Pack; Take per package insert (instructions). Take as directed on the box  -      clotrimazole-betamethasone 1-0.05 % External Cream; Apply 1 Application topically 2 (two) times daily.            Plan:       Differential discussed with patient.  In regards to the bilateral plantar fasciitis I recommend daily calf stretching as well as completing the Medrol Dosepak.  Discussed the importance of avoiding to walk barefoot.  Bilateral fourth interspace macerations dispensed iodine and instructed patient to perform iodine applications once daily.  Informed patient that there are no signs of infection to bilateral feet.  In regards to the athlete's foot on the left foot a prescription for topical clotrimazole/betamethasone was sent to patient's pharmacy.  Patient to use the ointment as instructed.  Discussed the importance of pedal hygiene.  Follow-up with physical therapy.  Referral to physical therapy provided.  Follow-up in clinic with podiatry if symptoms fail to improve or worsen.      The patient indicates understanding of these issues and agrees to the plan.        Karin Floers DPM

## (undated) NOTE — LETTER
10/5/2023              [address] [facility] [address]         To whom it may concern,    Michel Chaney is currently a patient under my medical care. Patient was evaluated. He continues to have Achilles tendinitis. Do you think phonophoresis would be helpful? If you require additional information please do not hesitate to contact my office.         Sincerely,     Shaila Rivera DO  [address] [address] [phone]        Document electronically generated by:  Shaila Rivera DO

## (undated) NOTE — LETTER
AUTHORIZATION FOR SURGICAL OPERATION OR OTHER PROCEDURE    1. I hereby authorize Dr. Felicia Gonsalez  and [facility], [facility] staff assigned to my case to perform the following operation and/or procedure at the [facility], [facility]:    Cortisone injection in Bilateral heels   _______________________________________________________________________________________________      _______________________________________________________________________________________________    2. My physician has explained the nature and purpose of the operation or other procedure, possible alternative methods of treatment, the risks involved, and the possibility of complication to me. I acknowledge that no guarantee has been made as to the result that may be obtained. 3.  I recognize that, during the course of this operation, or other procedure, unforseen conditions may necessitate additional or different procedure than those listed above. I, therefore, further authorize and request that the above named physician, his/her physician assistants or designees perform such procedures as are, in his/her professional opinion, necessary and desirable. 4.  Any tissue or organs removed in the operation or other procedure may be disposed of by and at the discretion of the [facility], [facility] and [facility]. 5.  I understand that in the event of a medical emergency, I will be transported by local paramedics to [facility] or other hospital emergency department. 6.  I certify that I have read and fully understand the above consent to operation and/or other procedure. 7.  I acknowledge that my physician has explained sedation/analgesia administration to me including the risks and benefits. I consent to the administration of sedation/analgesia as may be necessary or desirable in the judgement of my physician.     Witness signature: ___________________________________________________ Date: ______/______/_____                    Time:  ________ A. M.  P.M. Patient Name:  ______________________________________________________  (please print)      Patient signature:  ___________________________________________________             Relationship to Patient:           []  Parent    Responsible person                          []  Spouse  In case of minor or                    [] Other  _____________   Incompetent name:  __________________________________________________                               (please print)      _____________      Responsible person  In case of minor or  Incompetent signature:  _______________________________________________    Statement of Physician  My signature below affirms that prior to the time of the procedure, I have explained to the patient and/or his/her guardian, the risks and benefits involved in the proposed treatment and any reasonable alternative to the proposed treatment. I have also explained the risks and benefits involved in the refusal of the proposed treatment and have answered the patient's questions.                         Date:  ______/______/_______  Provider                      Signature:  __________________________________________________________       Time:  ___________ A.M    P.M.

## (undated) NOTE — LETTER
4/26/2023          To Whom It May Concern:    Jose Best is currently under my medical care and may not return to work for 3 weeks. If you require additional information please contact our office.         Sincerely,    Teri Sinclair MD

## (undated) NOTE — LETTER
AUTHORIZATION FOR SURGICAL OPERATION OR OTHER PROCEDURE    1. I hereby authorize Dr. Karin Flores, and [facility] staff assigned to my case to perform the following operation and/or procedure at the [facility] site:    _______________________________________________________________________________________________    Bilateral heel cortisone injections  _______________________________________________________________________________________________    2.  My physician has explained the nature and purpose of the operation or other procedure, possible alternative methods of treatment, the risks involved, and the possibility of complication to me.  I acknowledge that no guarantee has been made as to the result that may be obtained.  3.  I recognize that, during the course of this operation, or other procedure, unforseen conditions may necessitate additional or different procedure than those listed above.  I, therefore, further authorize and request that the above named physician, his/her physician assistants or designees perform such procedures as are, in his/her professional opinion, necessary and desirable.  4.  Any tissue or organs removed in the operation or other procedure may be disposed of by and at the discretion of the [facility] and [facility].  5.  I understand that in the event of a medical emergency, I will be transported by local paramedics to [facility] or other hospital emergency department.  6.  I certify that I have read and fully understand the above consent to operation and/or other procedure.    7.  I acknowledge that my physician has explained sedation/analgesia administration to me including the risks and benefits.  I consent to the administration of sedation/analgesia as may be necessary or desirable in the judgement of my physician.    Witness signature: ___________________________________________________ Date:   ______/______/_____                    Time:  ________ A.M.  P.M.       Patient Name:  ______________________________________________________  (please print)      Patient signature:  ___________________________________________________             Relationship to Patient:           []  Parent    Responsible person                          []  Spouse  In case of minor or                    [] Other  _____________   Incompetent name:  __________________________________________________                               (please print)      _____________      Responsible person  In case of minor or  Incompetent signature:  _______________________________________________    Statement of Physician  My signature below affirms that prior to the time of the procedure, I have explained to the patient and/or his/her guardian, the risks and benefits involved in the proposed treatment and any reasonable alternative to the proposed treatment.  I have also explained the risks and benefits involved in the refusal of the proposed treatment and have answered the patient's questions.                        Date:  ______/______/_______  Provider                      Signature:  __________________________________________________________       Time:  ___________ A.M    P.M.

## (undated) NOTE — LETTER
Date & Time: 3/25/2025  Patient: Dav Washburn  Encounter Provider(s):    Cyril Espinal MD       To Whom It May Concern:    Dav Washburn was seen and treated in our department on 3/22/2025. He should not return to school until March 25, 2025. May return sooner if better .    If you have any questions or concerns, please do not hesitate to call.        _________Dr. Cyril Espinal____________________  Physician/APC Signature

## (undated) NOTE — LETTER
March 22, 2025    Patient: Dav Washburn   Date of Visit: 3/22/2025       To Whom It May Concern:    Dav Washburn was seen and treated in our emergency department on 3/22/2025. He should not return to work until March 26 May return sooner if better .    If you have any questions or concerns, please don't hesitate to call.       Encounter Provider(s):    Cyril Espinal MD

## (undated) NOTE — LETTER
Date & Time: 10/3/2024, 10:47 AM  Patient: Dav Washburn  Encounter Provider(s):    Killian Raygoza DO       To Whom It May Concern:    Dav Washburn was seen and treated in our department on 10/3/2024. He should not return to work until 10/4/2024 .    If you have any questions or concerns, please do not hesitate to call.        _____________________________  Physician/APC Signature

## (undated) NOTE — LETTER
5/19/2023          To Whom It May Concern:    Chani Max is currently under my medical care and may return to work on Sunday 5/21/2023 with no restrictions. If you require additional information please contact our office.         Sincerely,    Val Mahmood MD